# Patient Record
Sex: FEMALE | Race: WHITE | Employment: OTHER | URBAN - METROPOLITAN AREA
[De-identification: names, ages, dates, MRNs, and addresses within clinical notes are randomized per-mention and may not be internally consistent; named-entity substitution may affect disease eponyms.]

---

## 2017-01-17 ENCOUNTER — TELEPHONE (OUTPATIENT)
Dept: INTERNAL MEDICINE CLINIC | Facility: CLINIC | Age: 57
End: 2017-01-17

## 2017-01-17 RX ORDER — AMOXICILLIN 500 MG/1
500 CAPSULE ORAL 2 TIMES DAILY
Qty: 14 CAPSULE | Refills: 0 | Status: SHIPPED | OUTPATIENT
Start: 2017-01-17 | End: 2017-01-24

## 2017-01-17 NOTE — TELEPHONE ENCOUNTER
Amoxicillin 500 mg bid  #14 sent to [facility]/ DR MASON   states pt will need to be off work for at least 3 days so will make appt to be seen because she needs note for work  Will start meds today and will be seen later this week

## 2017-01-17 NOTE — TELEPHONE ENCOUNTER
Pt spouse calling, pt lost her voice, coughing, runny nose, started 1/13, pt would like something called into [facility], please call spouse Pepe Payan [phone]    Tasked to nursing high

## 2017-01-17 NOTE — TELEPHONE ENCOUNTER
We can call in amoxicillin 500 mg. Quantity #14.  1 p.o. twice a day. Twice daily. No refills.   I can see her if symptoms persist.

## 2017-01-18 ENCOUNTER — HOSPITAL ENCOUNTER (OUTPATIENT)
Dept: GENERAL RADIOLOGY | Facility: HOSPITAL | Age: 57
Discharge: HOME | End: 2017-01-18
Attending: NEUROLOGICAL SURGERY
Payer: COMMERCIAL

## 2017-01-18 ENCOUNTER — PRIOR ORIGINAL RECORDS (OUTPATIENT)
Dept: OTHER | Age: 57
End: 2017-01-18

## 2017-01-18 ENCOUNTER — LAB ENCOUNTER (OUTPATIENT)
Dept: LAB | Facility: HOSPITAL | Age: 57
End: 2017-01-18
Attending: NEUROLOGICAL SURGERY
Payer: COMMERCIAL

## 2017-01-18 DIAGNOSIS — M54.50 LOW BACK PAIN: Primary | ICD-10-CM

## 2017-01-18 DIAGNOSIS — M54.5 LOW BACK PAIN, UNSPECIFIED BACK PAIN LATERALITY, UNSPECIFIED CHRONICITY, WITH SCIATICA PRESENCE UNSPECIFIED: ICD-10-CM

## 2017-01-18 DIAGNOSIS — M54.50 CHRONIC LOW BACK PAIN: ICD-10-CM

## 2017-01-18 DIAGNOSIS — R79.89 ELEVATED TSH: ICD-10-CM

## 2017-01-18 DIAGNOSIS — Z13.828 SCOLIOSIS CONCERN: ICD-10-CM

## 2017-01-18 DIAGNOSIS — R73.9 ELEVATED BLOOD SUGAR: ICD-10-CM

## 2017-01-18 DIAGNOSIS — G89.29 CHRONIC LOW BACK PAIN: ICD-10-CM

## 2017-01-18 LAB
ANION GAP SERPL CALC-SCNC: 8 MMOL/L (ref 0–18)
BUN SERPL-MCNC: 9 MG/DL (ref 8–20)
BUN/CREAT SERPL: 11.7 (ref 10–20)
CALCIUM SERPL-MCNC: 9.5 MG/DL (ref 8.5–10.5)
CHLORIDE SERPL-SCNC: 104 MMOL/L (ref 95–110)
CHOLEST SERPL-MCNC: 155 MG/DL (ref 110–200)
CO2 SERPL-SCNC: 30 MMOL/L (ref 22–32)
CREAT SERPL-MCNC: 0.77 MG/DL (ref 0.5–1.5)
GLUCOSE SERPL-MCNC: 97 MG/DL (ref 70–99)
HBA1C MFR BLD: 5.2 % (ref 4–6)
HDLC SERPL-MCNC: 49 MG/DL
LDLC SERPL CALC-MCNC: 93 MG/DL (ref 0–99)
NONHDLC SERPL-MCNC: 106 MG/DL
OSMOLALITY UR CALC.SUM OF ELEC: 293 MOSM/KG (ref 275–295)
POTASSIUM SERPL-SCNC: 4.3 MMOL/L (ref 3.3–5.1)
RHEUMATOID FACT SER QL: <5 IU/ML
SODIUM SERPL-SCNC: 142 MMOL/L (ref 136–144)
TRIGL SERPL-MCNC: 65 MG/DL (ref 1–149)

## 2017-01-18 PROCEDURE — 80061 LIPID PANEL: CPT

## 2017-01-18 PROCEDURE — 83036 HEMOGLOBIN GLYCOSYLATED A1C: CPT

## 2017-01-18 PROCEDURE — 72110 X-RAY EXAM L-2 SPINE 4/>VWS: CPT

## 2017-01-18 PROCEDURE — 84443 ASSAY THYROID STIM HORMONE: CPT

## 2017-01-18 PROCEDURE — 36415 COLL VENOUS BLD VENIPUNCTURE: CPT

## 2017-01-18 PROCEDURE — 86431 RHEUMATOID FACTOR QUANT: CPT

## 2017-01-18 PROCEDURE — 80048 BASIC METABOLIC PNL TOTAL CA: CPT

## 2017-01-18 PROCEDURE — 72082 X-RAY EXAM ENTIRE SPI 2/3 VW: CPT

## 2017-01-18 PROCEDURE — 84439 ASSAY OF FREE THYROXINE: CPT

## 2017-01-19 ENCOUNTER — TELEPHONE (OUTPATIENT)
Dept: INTERNAL MEDICINE CLINIC | Facility: CLINIC | Age: 57
End: 2017-01-19

## 2017-01-19 DIAGNOSIS — R79.89 ELEVATED TSH: Primary | ICD-10-CM

## 2017-01-19 LAB
T4 FREE SERPL-MCNC: 0.93 NG/DL (ref 0.58–1.64)
TSH SERPL-ACNC: 4.27 UIU/ML (ref 0.34–5.6)

## 2017-02-02 LAB
BUN: 9 MG/DL
CALCIUM: 9.5 MG/DL
CHLORIDE: 104 MEQ/L
CHOLESTEROL, TOTAL: 155 MG/DL
CREATININE, SERUM: 0.77 MG/DL
FREE T4: 0.93 MG/DL
GLUCOSE: 97 MG/DL
HDL CHOLESTEROL: 49 MG/DL
LDL CHOLESTEROL: 93 MG/DL
NON-HDL CHOLESTEROL: 106 MG/DL
POTASSIUM, SERUM: 4.3 MEQ/L
SODIUM: 142 MEQ/L
THYROID STIMULATING HORMONE: 4.27 MLU/L
TRIGLYCERIDES: 65 MG/DL

## 2017-02-07 ENCOUNTER — PRIOR ORIGINAL RECORDS (OUTPATIENT)
Dept: OTHER | Age: 57
End: 2017-02-07

## 2017-02-07 ENCOUNTER — TELEPHONE (OUTPATIENT)
Dept: INTERNAL MEDICINE CLINIC | Facility: CLINIC | Age: 57
End: 2017-02-07

## 2017-02-07 DIAGNOSIS — I35.1 NONRHEUMATIC AORTIC (VALVE) INSUFFICIENCY: Primary | ICD-10-CM

## 2017-02-07 NOTE — TELEPHONE ENCOUNTER
Called patient and relayed message that referral had been entered. Patient verbalized understanding.

## 2017-02-08 ENCOUNTER — HOSPITAL ENCOUNTER (OUTPATIENT)
Dept: NUCLEAR MEDICINE | Facility: HOSPITAL | Age: 57
Discharge: HOME | End: 2017-02-08
Attending: NEUROLOGICAL SURGERY
Payer: COMMERCIAL

## 2017-02-08 ENCOUNTER — HOSPITAL ENCOUNTER (OUTPATIENT)
Dept: CT IMAGING | Facility: HOSPITAL | Age: 57
Discharge: HOME | End: 2017-02-08
Attending: NEUROLOGICAL SURGERY
Payer: COMMERCIAL

## 2017-02-08 DIAGNOSIS — M54.5 LOW BACK PAIN, UNSPECIFIED BACK PAIN LATERALITY, UNSPECIFIED CHRONICITY, WITH SCIATICA PRESENCE UNSPECIFIED: ICD-10-CM

## 2017-02-08 DIAGNOSIS — G89.29 OTHER CHRONIC PAIN: ICD-10-CM

## 2017-02-08 PROCEDURE — 78306 BONE IMAGING WHOLE BODY: CPT

## 2017-02-08 PROCEDURE — 72131 CT LUMBAR SPINE W/O DYE: CPT

## 2017-02-08 PROCEDURE — 72192 CT PELVIS W/O DYE: CPT

## 2017-04-06 ENCOUNTER — TELEPHONE (OUTPATIENT)
Dept: INTERNAL MEDICINE CLINIC | Facility: CLINIC | Age: 57
End: 2017-04-06

## 2017-05-15 ENCOUNTER — TELEPHONE (OUTPATIENT)
Dept: INTERNAL MEDICINE CLINIC | Facility: CLINIC | Age: 57
End: 2017-05-15

## 2017-05-15 DIAGNOSIS — Z01.419 ENCOUNTER FOR ANNUAL ROUTINE GYNECOLOGICAL EXAMINATION: Primary | ICD-10-CM

## 2017-05-26 ENCOUNTER — HOSPITAL ENCOUNTER (OUTPATIENT)
Dept: CV DIAGNOSTICS | Facility: HOSPITAL | Age: 57
Discharge: HOME | End: 2017-05-26
Attending: INTERNAL MEDICINE
Payer: COMMERCIAL

## 2017-05-26 DIAGNOSIS — I35.1 AORTIC INSUFFICIENCY: ICD-10-CM

## 2017-05-26 PROCEDURE — 93306 TTE W/DOPPLER COMPLETE: CPT | Performed by: INTERNAL MEDICINE

## 2017-06-01 ENCOUNTER — PRIOR ORIGINAL RECORDS (OUTPATIENT)
Dept: OTHER | Age: 57
End: 2017-06-01

## 2017-06-21 ENCOUNTER — LAB REQUISITION (OUTPATIENT)
Dept: LAB | Facility: HOSPITAL | Age: 57
End: 2017-06-21
Payer: COMMERCIAL

## 2017-06-21 DIAGNOSIS — Z11.51 ENCOUNTER FOR SCREENING FOR HUMAN PAPILLOMAVIRUS (HPV): ICD-10-CM

## 2017-06-21 DIAGNOSIS — Z01.419 ENCOUNTER FOR GYNECOLOGICAL EXAMINATION WITHOUT ABNORMAL FINDING: ICD-10-CM

## 2017-06-21 PROCEDURE — 87624 HPV HI-RISK TYP POOLED RSLT: CPT | Performed by: OBSTETRICS & GYNECOLOGY

## 2017-06-21 PROCEDURE — 88175 CYTOPATH C/V AUTO FLUID REDO: CPT | Performed by: OBSTETRICS & GYNECOLOGY

## 2017-06-22 PROCEDURE — 88108 CYTOPATH CONCENTRATE TECH: CPT | Performed by: UROLOGY

## 2017-07-06 ENCOUNTER — PRIOR ORIGINAL RECORDS (OUTPATIENT)
Dept: OTHER | Age: 57
End: 2017-07-06

## 2017-07-06 ENCOUNTER — MYAURORA ACCOUNT LINK (OUTPATIENT)
Dept: OTHER | Age: 57
End: 2017-07-06

## 2017-07-14 ENCOUNTER — HOSPITAL ENCOUNTER (OUTPATIENT)
Dept: MAMMOGRAPHY | Facility: HOSPITAL | Age: 57
Discharge: HOME | End: 2017-07-14
Attending: OBSTETRICS & GYNECOLOGY
Payer: COMMERCIAL

## 2017-07-14 ENCOUNTER — HOSPITAL ENCOUNTER (OUTPATIENT)
Dept: ULTRASOUND IMAGING | Facility: HOSPITAL | Age: 57
Discharge: HOME | End: 2017-07-14
Attending: OBSTETRICS & GYNECOLOGY
Payer: COMMERCIAL

## 2017-07-14 DIAGNOSIS — Z12.31 ENCOUNTER FOR SCREENING MAMMOGRAM FOR MALIGNANT NEOPLASM OF BREAST: ICD-10-CM

## 2017-07-14 DIAGNOSIS — N83.209 UNSPECIFIED OVARIAN CYST, UNSPECIFIED SIDE: ICD-10-CM

## 2017-07-14 PROCEDURE — 77067 SCR MAMMO BI INCL CAD: CPT | Performed by: OBSTETRICS & GYNECOLOGY

## 2017-07-14 PROCEDURE — 76856 US EXAM PELVIC COMPLETE: CPT | Performed by: OBSTETRICS & GYNECOLOGY

## 2017-07-14 PROCEDURE — 76830 TRANSVAGINAL US NON-OB: CPT | Performed by: OBSTETRICS & GYNECOLOGY

## 2017-07-26 ENCOUNTER — TELEPHONE (OUTPATIENT)
Dept: INTERNAL MEDICINE CLINIC | Facility: CLINIC | Age: 57
End: 2017-07-26

## 2017-07-26 DIAGNOSIS — G89.29 CHRONIC LOW BACK PAIN, UNSPECIFIED BACK PAIN LATERALITY, WITH SCIATICA PRESENCE UNSPECIFIED: Primary | ICD-10-CM

## 2017-07-26 DIAGNOSIS — M54.5 CHRONIC LOW BACK PAIN, UNSPECIFIED BACK PAIN LATERALITY, WITH SCIATICA PRESENCE UNSPECIFIED: Primary | ICD-10-CM

## 2017-07-26 NOTE — TELEPHONE ENCOUNTER
Pt will be seeing Dr Cj Lr, pain specialist, on Friday 7/28 and needs insurance referral sent to fax# [phone]    Doctor is located in the medical office building by [address] [place] [address] [address]      Please call pt to confirm that referral has been sent [phone]

## 2017-07-27 NOTE — TELEPHONE ENCOUNTER
KEV Ratliff. Dr. Eleazar Botello is a neurological surgeon. Is this the appropriate referral for patient.   It appears she wanted more of a pain specialist.

## 2017-07-27 NOTE — TELEPHONE ENCOUNTER
Please let patient know that I wanted to place the referral to the doctors she requested however they are not in her insurance plan. Carl Unger is out of [facility].  I placed referral if that is acceptable to her.   I wanted her to know this bef

## 2017-07-27 NOTE — TELEPHONE ENCOUNTER
I called Dr. Kayla Valdez' office. Patient was seen in January 18,2017. He recommended their pain clinic and told her she needed to go where she is allowed with her HMO.   I notified patient I would ask for your advise since she really should see our pain clini

## 2017-07-27 NOTE — TELEPHONE ENCOUNTER
Patient should not be going to [facility] pain clinic. Patient notified. I will call Dr. Kayla Valdez for consultation,  Patient needs to see our [address] [place] [address] pain clinic. Please place a new referral. Thank Cedric Huddleston

## 2017-07-27 NOTE — TELEPHONE ENCOUNTER
I think we should stay within network. Please let me know which pain specialist to refer that is within our network. I can then make referral.  Thank you for your help.

## 2017-07-27 NOTE — TELEPHONE ENCOUNTER
Called patient and relayed message from Dr. Ashly Saldaña. She verbalized understanding and requests authorized referral be mailed to her home. To [facility]. Please mail referral to patient's home.  She states she received a message that it was available to her on por

## 2017-08-02 ENCOUNTER — TELEPHONE (OUTPATIENT)
Dept: INTERNAL MEDICINE CLINIC | Facility: CLINIC | Age: 57
End: 2017-08-02

## 2017-08-02 ENCOUNTER — PRIOR ORIGINAL RECORDS (OUTPATIENT)
Dept: OTHER | Age: 57
End: 2017-08-02

## 2017-08-02 DIAGNOSIS — I35.1 NONRHEUMATIC AORTIC VALVE INSUFFICIENCY: Primary | ICD-10-CM

## 2017-08-02 NOTE — TELEPHONE ENCOUNTER
Patient has an appt today with Dr. Erin Alex at [phone]     Pt has bc hmo  And is asking for a referral     plz call pt and let her know this has been done and faxed     [phone]

## 2017-08-02 NOTE — TELEPHONE ENCOUNTER
To nursing--diagnosis according to Dr. Juan Lion notes for seeing cardiologist is nonrheumatic aortic valve insufficiency. Thanks.

## 2017-08-02 NOTE — TELEPHONE ENCOUNTER
Could you please advise for DR. MASON patient ? Referral started , but needs diagnosis thank you - to DR. Severo Pastures

## 2017-08-15 RX ORDER — LEVOTHYROXINE SODIUM 0.07 MG/1
TABLET ORAL
Qty: 90 TABLET | Refills: 3 | Status: SHIPPED | OUTPATIENT
Start: 2017-08-15 | End: 2018-08-18

## 2017-09-14 ENCOUNTER — LAB ENCOUNTER (OUTPATIENT)
Dept: LAB | Facility: HOSPITAL | Age: 57
End: 2017-09-14
Attending: INTERNAL MEDICINE
Payer: COMMERCIAL

## 2017-09-14 DIAGNOSIS — I35.1 AORTIC INCOMPETENCE: Primary | ICD-10-CM

## 2017-09-14 LAB
ANION GAP SERPL CALC-SCNC: 6 MMOL/L (ref 0–18)
BUN SERPL-MCNC: 12 MG/DL (ref 8–20)
BUN/CREAT SERPL: 15.4 (ref 10–20)
CALCIUM SERPL-MCNC: 9.8 MG/DL (ref 8.5–10.5)
CHLORIDE SERPL-SCNC: 105 MMOL/L (ref 95–110)
CO2 SERPL-SCNC: 30 MMOL/L (ref 22–32)
CREAT SERPL-MCNC: 0.78 MG/DL (ref 0.5–1.5)
GLUCOSE SERPL-MCNC: 91 MG/DL (ref 70–99)
OSMOLALITY UR CALC.SUM OF ELEC: 291 MOSM/KG (ref 275–295)
POTASSIUM SERPL-SCNC: 4.4 MMOL/L (ref 3.3–5.1)
SODIUM SERPL-SCNC: 141 MMOL/L (ref 136–144)

## 2017-09-14 PROCEDURE — 80048 BASIC METABOLIC PNL TOTAL CA: CPT

## 2017-09-14 PROCEDURE — 36415 COLL VENOUS BLD VENIPUNCTURE: CPT

## 2017-09-15 ENCOUNTER — TELEPHONE (OUTPATIENT)
Dept: INTERNAL MEDICINE CLINIC | Facility: CLINIC | Age: 57
End: 2017-09-15

## 2017-09-15 ENCOUNTER — PRIOR ORIGINAL RECORDS (OUTPATIENT)
Dept: OTHER | Age: 57
End: 2017-09-15

## 2017-09-15 LAB
BUN: 12 MG/DL
CALCIUM: 9.8 MG/DL
CHLORIDE: 105 MEQ/L
CREATININE, SERUM: 0.78 MG/DL
GLUCOSE: 91 MG/DL
POTASSIUM, SERUM: 4.4 MEQ/L
SODIUM: 141 MEQ/L

## 2017-09-15 NOTE — TELEPHONE ENCOUNTER
Please notify patient that her BMP was normal.  No other testing was done so I am assuming that we may just wanted to have checked her blood sugar and electrolytes and potassium. She appears fairly up-to-date with the rest of her routine labs.

## 2017-09-18 NOTE — TELEPHONE ENCOUNTER
Patient called back - relayed DR. MASON message and she verbalized understanding. Wants copy of lab results mailed to her home, also lab results of her daughter Meeta who is handicapped.  Copies of lab results mailed to patients home

## 2017-09-21 ENCOUNTER — TELEPHONE (OUTPATIENT)
Dept: INTERNAL MEDICINE CLINIC | Facility: CLINIC | Age: 57
End: 2017-09-21

## 2017-09-21 RX ORDER — METRONIDAZOLE 10 MG/G
1 GEL TOPICAL DAILY PRN
Qty: 60 G | Refills: 3 | Status: SHIPPED | OUTPATIENT
Start: 2017-09-21 | End: 2018-01-19

## 2017-09-21 NOTE — TELEPHONE ENCOUNTER
To [employer] - please call pt to schedule annual physical exam - last visit 11/11/16. To Dr. Ijeoma Choudhury - see below - last order was 5/2016.

## 2017-09-21 NOTE — TELEPHONE ENCOUNTER
abdon in  [facility] requesting a new rx for     Metronidazole 1% gel - last rx too old     plz fax new rx

## 2017-09-21 NOTE — TELEPHONE ENCOUNTER
Okay to refill metronidazole gel 1%. Apply daily to rash. I am not sure what quantity it comes in. Please call pharmacy to see if they can use last quantity. Okay to refill as needed.

## 2017-09-25 ENCOUNTER — PRIOR ORIGINAL RECORDS (OUTPATIENT)
Dept: OTHER | Age: 57
End: 2017-09-25

## 2017-09-27 ENCOUNTER — TELEPHONE (OUTPATIENT)
Dept: INTERNAL MEDICINE CLINIC | Facility: CLINIC | Age: 57
End: 2017-09-27

## 2017-09-27 DIAGNOSIS — H00.019 HORDEOLUM EXTERNUM, UNSPECIFIED LATERALITY: Primary | ICD-10-CM

## 2017-09-27 NOTE — TELEPHONE ENCOUNTER
Pt. Called asking if Dr. Dontae Morales will give her a referral to see Dr. Yoon Davenport the eye Dr.? She has a possible sty that is causing her pain, swelling, and redness. She has not seen Dr. Yoon Davenport before, but her  has. She has [employer].

## 2018-02-13 ENCOUNTER — TELEPHONE (OUTPATIENT)
Dept: INTERNAL MEDICINE CLINIC | Facility: CLINIC | Age: 58
End: 2018-02-13

## 2018-02-13 ENCOUNTER — PRIOR ORIGINAL RECORDS (OUTPATIENT)
Dept: OTHER | Age: 58
End: 2018-02-13

## 2018-02-13 ENCOUNTER — MYAURORA ACCOUNT LINK (OUTPATIENT)
Dept: OTHER | Age: 58
End: 2018-02-13

## 2018-02-13 DIAGNOSIS — I35.1 NONRHEUMATIC AORTIC (VALVE) INSUFFICIENCY: Primary | ICD-10-CM

## 2018-02-13 NOTE — TELEPHONE ENCOUNTER
Pt is at Dr Saravanan Estrada' office now for an appt  Needs insurance referral today as she has an hmo     Pt can be reached on her cell ph# [phone]

## 2018-02-13 NOTE — TELEPHONE ENCOUNTER
Referral generated as patient has seen Jade Lazar in the past. To [facility] to process as patient is there to be seen now

## 2018-03-16 ENCOUNTER — LAB ENCOUNTER (OUTPATIENT)
Dept: LAB | Facility: HOSPITAL | Age: 58
End: 2018-03-16
Attending: INTERNAL MEDICINE
Payer: COMMERCIAL

## 2018-03-16 ENCOUNTER — PRIOR ORIGINAL RECORDS (OUTPATIENT)
Dept: OTHER | Age: 58
End: 2018-03-16

## 2018-03-16 DIAGNOSIS — I35.1 AORTIC INCOMPETENCE: Primary | ICD-10-CM

## 2018-03-16 LAB
ALT SERPL-CCNC: 15 U/L (ref 14–54)
ANION GAP SERPL CALC-SCNC: 7 MMOL/L (ref 0–18)
AST SERPL-CCNC: 20 U/L (ref 15–41)
BUN SERPL-MCNC: 10 MG/DL (ref 8–20)
BUN/CREAT SERPL: 12.2 (ref 10–20)
CALCIUM SERPL-MCNC: 9.4 MG/DL (ref 8.5–10.5)
CHLORIDE SERPL-SCNC: 103 MMOL/L (ref 95–110)
CHOLEST SERPL-MCNC: 172 MG/DL (ref 110–200)
CO2 SERPL-SCNC: 29 MMOL/L (ref 22–32)
CREAT SERPL-MCNC: 0.82 MG/DL (ref 0.5–1.5)
GLUCOSE SERPL-MCNC: 93 MG/DL (ref 70–99)
HDLC SERPL-MCNC: 55 MG/DL
LDLC SERPL CALC-MCNC: 106 MG/DL (ref 0–99)
NONHDLC SERPL-MCNC: 117 MG/DL
OSMOLALITY UR CALC.SUM OF ELEC: 287 MOSM/KG (ref 275–295)
POTASSIUM SERPL-SCNC: 4.1 MMOL/L (ref 3.3–5.1)
SODIUM SERPL-SCNC: 139 MMOL/L (ref 136–144)
TRIGL SERPL-MCNC: 56 MG/DL (ref 1–149)

## 2018-03-16 PROCEDURE — 80048 BASIC METABOLIC PNL TOTAL CA: CPT

## 2018-03-16 PROCEDURE — 84450 TRANSFERASE (AST) (SGOT): CPT

## 2018-03-16 PROCEDURE — 36415 COLL VENOUS BLD VENIPUNCTURE: CPT

## 2018-03-16 PROCEDURE — 80061 LIPID PANEL: CPT

## 2018-03-16 PROCEDURE — 84460 ALANINE AMINO (ALT) (SGPT): CPT

## 2018-03-19 ENCOUNTER — PRIOR ORIGINAL RECORDS (OUTPATIENT)
Dept: OTHER | Age: 58
End: 2018-03-19

## 2018-03-19 LAB
ALT (SGPT): 15 U/L
AST (SGOT): 20 U/L
CALCIUM: 9.4 MG/DL
CHLORIDE: 103 MEQ/L
CHOLESTEROL, TOTAL: 172 MG/DL
CREATININE, SERUM: 0.82 MG/DL
GLUCOSE: 93 MG/DL
GLUCOSE: 93 MG/DL
HDL CHOLESTEROL: 55 MG/DL
LDL CHOLESTEROL: 106 MG/DL
POTASSIUM, SERUM: 4.1 MEQ/L
SGOT (AST): 20 IU/L
SGPT (ALT): 15 IU/L
SODIUM: 139 MEQ/L
TRIGLYCERIDES: 56 MG/DL

## 2018-05-03 ENCOUNTER — HOSPITAL ENCOUNTER (OUTPATIENT)
Dept: CV DIAGNOSTICS | Facility: HOSPITAL | Age: 58
Discharge: HOME | End: 2018-05-03
Attending: INTERNAL MEDICINE
Payer: COMMERCIAL

## 2018-05-03 DIAGNOSIS — I35.1 AORTIC VALVE INSUFFICIENCY, ETIOLOGY OF CARDIAC VALVE DISEASE UNSPECIFIED: ICD-10-CM

## 2018-05-03 PROCEDURE — 93306 TTE W/DOPPLER COMPLETE: CPT | Performed by: INTERNAL MEDICINE

## 2018-05-07 ENCOUNTER — PRIOR ORIGINAL RECORDS (OUTPATIENT)
Dept: OTHER | Age: 58
End: 2018-05-07

## 2018-05-14 ENCOUNTER — OFFICE VISIT (OUTPATIENT)
Dept: INTERNAL MEDICINE CLINIC | Facility: CLINIC | Age: 58
End: 2018-05-14

## 2018-05-14 VITALS
WEIGHT: 148 LBS | TEMPERATURE: 98 F | BODY MASS INDEX: 23.23 KG/M2 | DIASTOLIC BLOOD PRESSURE: 78 MMHG | SYSTOLIC BLOOD PRESSURE: 112 MMHG | OXYGEN SATURATION: 98 % | HEART RATE: 72 BPM | HEIGHT: 66.75 IN

## 2018-05-14 DIAGNOSIS — I35.1 NONRHEUMATIC AORTIC VALVE INSUFFICIENCY: ICD-10-CM

## 2018-05-14 DIAGNOSIS — Z01.419 ENCOUNTER FOR ANNUAL ROUTINE GYNECOLOGICAL EXAMINATION: ICD-10-CM

## 2018-05-14 DIAGNOSIS — Z20.1 EXPOSURE TO TB: ICD-10-CM

## 2018-05-14 DIAGNOSIS — M54.50 CHRONIC MIDLINE LOW BACK PAIN WITHOUT SCIATICA: ICD-10-CM

## 2018-05-14 DIAGNOSIS — R73.9 ELEVATED BLOOD SUGAR: ICD-10-CM

## 2018-05-14 DIAGNOSIS — Z00.00 ANNUAL PHYSICAL EXAM: Primary | ICD-10-CM

## 2018-05-14 DIAGNOSIS — G89.29 CHRONIC MIDLINE LOW BACK PAIN WITHOUT SCIATICA: ICD-10-CM

## 2018-05-14 DIAGNOSIS — Z00.00 ROUTINE HEALTH MAINTENANCE: ICD-10-CM

## 2018-05-14 PROCEDURE — 99396 PREV VISIT EST AGE 40-64: CPT | Performed by: INTERNAL MEDICINE

## 2018-05-14 PROCEDURE — 99214 OFFICE O/P EST MOD 30 MIN: CPT | Performed by: INTERNAL MEDICINE

## 2018-05-14 PROCEDURE — 99212 OFFICE O/P EST SF 10 MIN: CPT | Performed by: INTERNAL MEDICINE

## 2018-05-14 NOTE — PROGRESS NOTES
HPI:   Emerald Wilkins is a 59year old female presents with the following problems. Patient feels fairly well. She has no acute complaints today. She was evaluated by Dr. Nilam Gerber for low back pain. She never did get pain center evaluation. right hip    • Hypothyroidism    • Lumbar spondylosis       Past Surgical History:  6-5-2014: OTHER SURGICAL HISTORY      Comment: shoulder surgery  No date: TONSILLECTOMY   Family History   Problem Relation Age of Onset   • Other [OTHER] Father    • Heart 6.75\" (1.695 m)   Wt 148 lb (67.1 kg)   SpO2 98%   BMI 23.35 kg/m²     GENERAL:  well developed, well nourished. in no apparent distress  SKIN:  no rashes. no suspicious lesions in areas examined. HEENT:  Atraumatic. TM's normal,  canals clear.   [employer]. She is in the same room with them in fact sits next to them with her desk and where they reside. She states that many of them have coughs. Will go ahead and get TB QuantiFERON gold test.  - QUANTIFERON TB; Future    7.  Elevated blood

## 2018-05-31 ENCOUNTER — TELEPHONE (OUTPATIENT)
Dept: INTERNAL MEDICINE CLINIC | Facility: CLINIC | Age: 58
End: 2018-05-31

## 2018-05-31 NOTE — TELEPHONE ENCOUNTER
Called Dr. Migel Gautam office and spoke with Trey Kayser. Patient was seen there on January 18, 2017. She also had xrays and MRI. Trey Kayser will fax info now. HEMAL TolentinoR, notified to watch for fax and give to Dr. Ashly Saldaña.  If it does not arrive soon, we can call Erika Odell

## 2018-05-31 NOTE — TELEPHONE ENCOUNTER
Please call Dr. Mariaa Brown at [phone]. He had seen patient I believe sometime last year. Please ask him for his office visit notes and treatment plans that they may have been the records.  Thank you

## 2018-06-01 ENCOUNTER — TELEPHONE (OUTPATIENT)
Dept: INTERNAL MEDICINE CLINIC | Facility: CLINIC | Age: 58
End: 2018-06-01

## 2018-06-01 DIAGNOSIS — M25.552 PAIN OF BOTH HIP JOINTS: ICD-10-CM

## 2018-06-01 DIAGNOSIS — M25.551 PAIN OF BOTH HIP JOINTS: ICD-10-CM

## 2018-06-01 DIAGNOSIS — M54.50 CHRONIC LOW BACK PAIN WITHOUT SCIATICA, UNSPECIFIED BACK PAIN LATERALITY: Primary | ICD-10-CM

## 2018-06-01 DIAGNOSIS — G89.29 CHRONIC LOW BACK PAIN WITHOUT SCIATICA, UNSPECIFIED BACK PAIN LATERALITY: Primary | ICD-10-CM

## 2018-06-01 NOTE — TELEPHONE ENCOUNTER
Please call patient and let her know that I did get notes from Dr. Leena Lantigua the doctor that she saw back January 2017. He noted that he thought that she had bilateral hip bursitis.   He also noted he was not able to identify a structural pain generator in th

## 2018-06-05 NOTE — TELEPHONE ENCOUNTER
Please advise - raphael dpatient an relayed DR. MASON message . She is interested on seeing DR. Kirsty Solis - she saw someone in that office before - referral pending. Copy of reports from DR. Hernandez mailed to patients home - to DR. MASON

## 2018-07-02 ENCOUNTER — TELEPHONE (OUTPATIENT)
Dept: INTERNAL MEDICINE CLINIC | Facility: CLINIC | Age: 58
End: 2018-07-02

## 2018-07-02 ENCOUNTER — LAB ENCOUNTER (OUTPATIENT)
Dept: LAB | Facility: HOSPITAL | Age: 58
End: 2018-07-02
Attending: INTERNAL MEDICINE
Payer: COMMERCIAL

## 2018-07-02 DIAGNOSIS — Z00.00 ANNUAL PHYSICAL EXAM: ICD-10-CM

## 2018-07-02 DIAGNOSIS — G89.29 CHRONIC MIDLINE LOW BACK PAIN WITHOUT SCIATICA: ICD-10-CM

## 2018-07-02 DIAGNOSIS — M54.50 CHRONIC LOW BACK PAIN WITHOUT SCIATICA, UNSPECIFIED BACK PAIN LATERALITY: ICD-10-CM

## 2018-07-02 DIAGNOSIS — M54.50 CHRONIC MIDLINE LOW BACK PAIN WITHOUT SCIATICA: ICD-10-CM

## 2018-07-02 DIAGNOSIS — R31.29 HEMATURIA, MICROSCOPIC: Primary | ICD-10-CM

## 2018-07-02 DIAGNOSIS — R73.9 ELEVATED BLOOD SUGAR: ICD-10-CM

## 2018-07-02 DIAGNOSIS — G89.29 CHRONIC LOW BACK PAIN WITHOUT SCIATICA, UNSPECIFIED BACK PAIN LATERALITY: ICD-10-CM

## 2018-07-02 DIAGNOSIS — Z20.1 EXPOSURE TO TB: ICD-10-CM

## 2018-07-02 LAB
ALBUMIN SERPL BCP-MCNC: 4.1 G/DL (ref 3.5–4.8)
ALBUMIN/GLOB SERPL: 1.5 {RATIO} (ref 1–2)
ALP SERPL-CCNC: 70 U/L (ref 32–100)
ALT SERPL-CCNC: 18 U/L (ref 14–54)
ANION GAP SERPL CALC-SCNC: 8 MMOL/L (ref 0–18)
AST SERPL-CCNC: 21 U/L (ref 15–41)
BACTERIA UR QL AUTO: NEGATIVE /HPF
BASOPHILS # BLD: 0 K/UL (ref 0–0.2)
BASOPHILS NFR BLD: 0 %
BILIRUB SERPL-MCNC: 0.9 MG/DL (ref 0.3–1.2)
BILIRUB UR QL: NEGATIVE
BUN SERPL-MCNC: 13 MG/DL (ref 8–20)
BUN/CREAT SERPL: 15.9 (ref 10–20)
CALCIUM SERPL-MCNC: 9.7 MG/DL (ref 8.5–10.5)
CHLORIDE SERPL-SCNC: 103 MMOL/L (ref 95–110)
CHOLEST SERPL-MCNC: 177 MG/DL (ref 110–200)
CLARITY UR: CLEAR
CO2 SERPL-SCNC: 30 MMOL/L (ref 22–32)
COLOR UR: YELLOW
CREAT SERPL-MCNC: 0.82 MG/DL (ref 0.5–1.5)
EOSINOPHIL # BLD: 0 K/UL (ref 0–0.7)
EOSINOPHIL NFR BLD: 1 %
ERYTHROCYTE [DISTWIDTH] IN BLOOD BY AUTOMATED COUNT: 12.9 % (ref 11–15)
GLOBULIN PLAS-MCNC: 2.8 G/DL (ref 2.5–3.7)
GLUCOSE SERPL-MCNC: 94 MG/DL (ref 70–99)
GLUCOSE UR-MCNC: NEGATIVE MG/DL
HBA1C MFR BLD: 5.3 % (ref 4–6)
HCT VFR BLD AUTO: 40.1 % (ref 35–48)
HDLC SERPL-MCNC: 54 MG/DL
HGB BLD-MCNC: 13.6 G/DL (ref 12–16)
KETONES UR-MCNC: NEGATIVE MG/DL
LDLC SERPL CALC-MCNC: 115 MG/DL (ref 0–99)
LEUKOCYTE ESTERASE UR QL STRIP.AUTO: NEGATIVE
LYMPHOCYTES # BLD: 1.2 K/UL (ref 1–4)
LYMPHOCYTES NFR BLD: 26 %
MCH RBC QN AUTO: 31.1 PG (ref 27–32)
MCHC RBC AUTO-ENTMCNC: 33.9 G/DL (ref 32–37)
MCV RBC AUTO: 91.7 FL (ref 80–100)
MONOCYTES # BLD: 0.3 K/UL (ref 0–1)
MONOCYTES NFR BLD: 7 %
NEUTROPHILS # BLD AUTO: 2.9 K/UL (ref 1.8–7.7)
NEUTROPHILS NFR BLD: 66 %
NITRITE UR QL STRIP.AUTO: NEGATIVE
NONHDLC SERPL-MCNC: 123 MG/DL
OSMOLALITY UR CALC.SUM OF ELEC: 292 MOSM/KG (ref 275–295)
PATIENT FASTING: YES
PH UR: 6 [PH] (ref 5–8)
PLATELET # BLD AUTO: 180 K/UL (ref 140–400)
PMV BLD AUTO: 7.9 FL (ref 7.4–10.3)
POTASSIUM SERPL-SCNC: 4.5 MMOL/L (ref 3.3–5.1)
PROT SERPL-MCNC: 6.9 G/DL (ref 5.9–8.4)
PROT UR-MCNC: NEGATIVE MG/DL
RBC # BLD AUTO: 4.37 M/UL (ref 3.7–5.4)
RBC #/AREA URNS AUTO: 2 /HPF
SODIUM SERPL-SCNC: 141 MMOL/L (ref 136–144)
SP GR UR STRIP: 1.02 (ref 1–1.03)
TRIGL SERPL-MCNC: 41 MG/DL (ref 1–149)
TSH SERPL-ACNC: 2.14 UIU/ML (ref 0.45–5.33)
UROBILINOGEN UR STRIP-ACNC: <2
VIT C UR-MCNC: NEGATIVE MG/DL
WBC # BLD AUTO: 4.5 K/UL (ref 4–11)
WBC #/AREA URNS AUTO: <1 /HPF

## 2018-07-02 PROCEDURE — 80061 LIPID PANEL: CPT

## 2018-07-02 PROCEDURE — 84443 ASSAY THYROID STIM HORMONE: CPT

## 2018-07-02 PROCEDURE — 80053 COMPREHEN METABOLIC PANEL: CPT

## 2018-07-02 PROCEDURE — 86480 TB TEST CELL IMMUN MEASURE: CPT

## 2018-07-02 PROCEDURE — 36415 COLL VENOUS BLD VENIPUNCTURE: CPT

## 2018-07-02 PROCEDURE — 85025 COMPLETE CBC W/AUTO DIFF WBC: CPT

## 2018-07-02 PROCEDURE — 81001 URINALYSIS AUTO W/SCOPE: CPT

## 2018-07-02 PROCEDURE — 83036 HEMOGLOBIN GLYCOSYLATED A1C: CPT

## 2018-07-02 NOTE — TELEPHONE ENCOUNTER
Please call patient and let her know that her labs I think he was satisfactory. I placed a copy on cart for us to mail to her for her review. Noted is that her glycohemoglobin is very good at 5.3. Thyroid function good. Chemistries all look good to me.

## 2018-07-03 NOTE — TELEPHONE ENCOUNTER
Spoke with Mirta to inform her of your message below. Also mailed her and Meeta home their labs along with Meeta's XR. She is aware that both her and her daughters TB is pending.      She stated she will go to [address] [place] [address] for Urine Test. Also stated she would like a re

## 2018-07-04 LAB
M TB IFN-G CD4+ BCKGRND COR BLD-ACNC: 0.01 IU/ML
M TB IFN-G CD4+ T-CELLS BLD-ACNC: 0.03 IU/ML
M TB TUBERC IFN-G BLD QL: NEGATIVE
M TB TUBERC IGNF/MITOGEN IGNF CONTROL: 9.51 IU/ML

## 2018-07-05 NOTE — TELEPHONE ENCOUNTER
Please let patient know that her TB test and her daughters Meeta were both good. Negative. No infection with tuberculosis. ( I have similar message in Meeta's chart ). In addition message from [facility] with urinalysis and referral to Dr. Ying Bourne generated.

## 2018-07-05 NOTE — TELEPHONE ENCOUNTER
WENCESLAO per HIPAA with Dr. Nicholas Florentino messages from 7/3 and 7/5. Instructed patient to call with questions.

## 2018-08-20 RX ORDER — LEVOTHYROXINE SODIUM 0.07 MG/1
TABLET ORAL
Qty: 90 TABLET | Refills: 3 | Status: SHIPPED | OUTPATIENT
Start: 2018-08-20 | End: 2019-09-01

## 2018-08-21 RX ORDER — LEVOTHYROXINE SODIUM 0.07 MG/1
TABLET ORAL
Qty: 90 TABLET | Refills: 0 | OUTPATIENT
Start: 2018-08-21

## 2019-02-28 VITALS
WEIGHT: 150 LBS | RESPIRATION RATE: 18 BRPM | BODY MASS INDEX: 22.73 KG/M2 | SYSTOLIC BLOOD PRESSURE: 120 MMHG | DIASTOLIC BLOOD PRESSURE: 70 MMHG | HEIGHT: 68 IN | HEART RATE: 78 BPM

## 2019-02-28 VITALS
HEART RATE: 68 BPM | HEIGHT: 68 IN | SYSTOLIC BLOOD PRESSURE: 108 MMHG | DIASTOLIC BLOOD PRESSURE: 62 MMHG | RESPIRATION RATE: 18 BRPM | WEIGHT: 145 LBS | BODY MASS INDEX: 21.98 KG/M2

## 2019-03-01 VITALS
WEIGHT: 140 LBS | DIASTOLIC BLOOD PRESSURE: 60 MMHG | SYSTOLIC BLOOD PRESSURE: 122 MMHG | HEIGHT: 68 IN | HEART RATE: 81 BPM | RESPIRATION RATE: 18 BRPM | BODY MASS INDEX: 21.22 KG/M2

## 2019-03-22 RX ORDER — ENALAPRIL MALEATE 2.5 MG/1
TABLET ORAL
COMMUNITY
Start: 2018-10-22 | End: 2019-11-08 | Stop reason: SDUPTHER

## 2019-04-19 RX ORDER — MULTIVITAMIN
1 CAPSULE ORAL DAILY
COMMUNITY
Start: 2015-05-12

## 2019-04-19 RX ORDER — LEVOTHYROXINE SODIUM 88 UG/1
88 TABLET ORAL DAILY
COMMUNITY
Start: 2015-05-12

## 2019-05-02 ENCOUNTER — OFFICE VISIT (OUTPATIENT)
Dept: CARDIOLOGY | Age: 59
End: 2019-05-02

## 2019-05-02 VITALS
HEART RATE: 67 BPM | SYSTOLIC BLOOD PRESSURE: 128 MMHG | WEIGHT: 153 LBS | HEIGHT: 67 IN | DIASTOLIC BLOOD PRESSURE: 76 MMHG | BODY MASS INDEX: 24.01 KG/M2 | OXYGEN SATURATION: 98 %

## 2019-05-02 DIAGNOSIS — I35.1 AORTIC VALVE INSUFFICIENCY, ETIOLOGY OF CARDIAC VALVE DISEASE UNSPECIFIED: Primary | ICD-10-CM

## 2019-05-02 PROCEDURE — 99214 OFFICE O/P EST MOD 30 MIN: CPT | Performed by: INTERNAL MEDICINE

## 2019-05-02 ASSESSMENT — PATIENT HEALTH QUESTIONNAIRE - PHQ9
SUM OF ALL RESPONSES TO PHQ9 QUESTIONS 1 AND 2: 0
2. FEELING DOWN, DEPRESSED OR HOPELESS: NOT AT ALL
SUM OF ALL RESPONSES TO PHQ9 QUESTIONS 1 AND 2: 0
1. LITTLE INTEREST OR PLEASURE IN DOING THINGS: NOT AT ALL

## 2019-05-20 ENCOUNTER — TELEPHONE (OUTPATIENT)
Dept: CARDIOLOGY | Age: 59
End: 2019-05-20

## 2019-05-23 ENCOUNTER — OFFICE VISIT (OUTPATIENT)
Dept: INTERNAL MEDICINE CLINIC | Facility: CLINIC | Age: 59
End: 2019-05-23

## 2019-05-23 VITALS
HEIGHT: 66.5 IN | TEMPERATURE: 98 F | BODY MASS INDEX: 24.27 KG/M2 | WEIGHT: 152.81 LBS | OXYGEN SATURATION: 98 % | SYSTOLIC BLOOD PRESSURE: 138 MMHG | HEART RATE: 73 BPM | DIASTOLIC BLOOD PRESSURE: 78 MMHG

## 2019-05-23 DIAGNOSIS — M25.562 PAIN IN BOTH KNEES, UNSPECIFIED CHRONICITY: ICD-10-CM

## 2019-05-23 DIAGNOSIS — G89.29 CHRONIC LOW BACK PAIN WITHOUT SCIATICA, UNSPECIFIED BACK PAIN LATERALITY: ICD-10-CM

## 2019-05-23 DIAGNOSIS — M54.50 CHRONIC LOW BACK PAIN WITHOUT SCIATICA, UNSPECIFIED BACK PAIN LATERALITY: ICD-10-CM

## 2019-05-23 DIAGNOSIS — Z00.00 ROUTINE HEALTH MAINTENANCE: ICD-10-CM

## 2019-05-23 DIAGNOSIS — Z00.00 ANNUAL PHYSICAL EXAM: Primary | ICD-10-CM

## 2019-05-23 DIAGNOSIS — M25.561 PAIN IN BOTH KNEES, UNSPECIFIED CHRONICITY: ICD-10-CM

## 2019-05-23 DIAGNOSIS — I35.1 NONRHEUMATIC AORTIC VALVE INSUFFICIENCY: ICD-10-CM

## 2019-05-23 PROCEDURE — 99396 PREV VISIT EST AGE 40-64: CPT | Performed by: INTERNAL MEDICINE

## 2019-05-23 RX ORDER — METRONIDAZOLE 10 MG/G
1 GEL TOPICAL DAILY
COMMUNITY
End: 2020-05-12

## 2019-05-23 NOTE — PROGRESS NOTES
HPI:   Emerald Wilkins is a 59year old female presents with the following problems. Patient presents for annual physical.    She feels well. She has no acute complaints. She wishes referral to Dr. Loni Duarte from physiatry.   This is for her  Diagnosis Date   • ACL (anterior cruciate ligament) tear    • Aortic insufficiency    • Bursitis of right hip    • Hypothyroidism    • Lumbar spondylosis       Past Surgical History:   Procedure Laterality Date   • OTHER SURGICAL HISTORY  6-5-2014    carl Negative Negative    Blood Urine Small (A) Negative    Nitrite Urine Negative Negative    Urobilinogen Urine <2.0 <2.0    Leukocyte Esterase Urine Negative Negative    Ascorbic Acid Urine Negative Negative mg/dL    Squamous Epi.  Cells Few /HPF    WBC Urine Pulse 73   Temp 97.9 °F (36.6 °C) (Oral)   Ht 5' 6.5\" (1.689 m)   Wt 152 lb 12.8 oz (69.3 kg)   SpO2 98%   BMI 24.29 kg/m²     GENERAL:  well developed, well nourished. in no apparent distress  SKIN:  no rashes. no suspicious lesions in areas examined.

## 2019-06-29 ENCOUNTER — LAB ENCOUNTER (OUTPATIENT)
Dept: LAB | Facility: HOSPITAL | Age: 59
End: 2019-06-29
Attending: OBSTETRICS & GYNECOLOGY
Payer: COMMERCIAL

## 2019-06-29 ENCOUNTER — HOSPITAL ENCOUNTER (OUTPATIENT)
Dept: MAMMOGRAPHY | Facility: HOSPITAL | Age: 59
Discharge: HOME | End: 2019-06-29
Attending: OBSTETRICS & GYNECOLOGY
Payer: COMMERCIAL

## 2019-06-29 DIAGNOSIS — Z00.00 ANNUAL PHYSICAL EXAM: ICD-10-CM

## 2019-06-29 DIAGNOSIS — Z12.31 ENCOUNTER FOR SCREENING MAMMOGRAM FOR MALIGNANT NEOPLASM OF BREAST: ICD-10-CM

## 2019-06-29 LAB
ABSOLUTE IMMATURE GRANULOCYTES (OFFPRE24): NORMAL
ALBUMIN SERPL-MCNC: 3.7 G/DL
ALBUMIN SERPL-MCNC: 3.7 G/DL (ref 3.4–5)
ALBUMIN/GLOB SERPL: 1.1 {RATIO}
ALBUMIN/GLOB SERPL: 1.1 {RATIO} (ref 1–2)
ALP LIVER SERPL-CCNC: 86 U/L (ref 46–118)
ALP SERPL-CCNC: 86 U/L
ALT SERPL-CCNC: 19 U/L
ALT SERPL-CCNC: 19 U/L (ref 13–56)
ANION GAP SERPL CALC-SCNC: 6 MMOL/L
ANION GAP SERPL CALC-SCNC: 6 MMOL/L (ref 0–18)
AST SERPL-CCNC: 14 U/L
AST SERPL-CCNC: 14 U/L (ref 15–37)
BASO+EOS+MONOS # BLD: NORMAL 10*3/UL
BASO+EOS+MONOS NFR BLD: NORMAL %
BASOPHILS # BLD AUTO: 0.01 X10(3) UL (ref 0–0.2)
BASOPHILS # BLD: NORMAL 10*3/UL
BASOPHILS NFR BLD AUTO: 0.2 %
BASOPHILS NFR BLD: NORMAL %
BILIRUB SERPL-MCNC: 0.5 MG/DL
BILIRUB SERPL-MCNC: 0.5 MG/DL (ref 0.1–2)
BUN BLD-MCNC: 14 MG/DL (ref 7–18)
BUN SERPL-MCNC: 14 MG/DL
BUN/CREAT SERPL: 17.9
BUN/CREAT SERPL: 17.9 (ref 10–20)
CALCIUM BLD-MCNC: 9.3 MG/DL (ref 8.5–10.1)
CALCIUM SERPL-MCNC: 9.3 MG/DL
CHLORIDE SERPL-SCNC: 108 MMOL/L
CHLORIDE SERPL-SCNC: 108 MMOL/L (ref 98–112)
CHOLEST SERPL-MCNC: 166 MG/DL
CHOLEST SMN-MCNC: 166 MG/DL (ref ?–200)
CHOLEST/HDLC SERPL: NORMAL {RATIO}
CO2 SERPL-SCNC: 29 MMOL/L
CO2 SERPL-SCNC: 29 MMOL/L (ref 21–32)
CREAT BLD-MCNC: 0.78 MG/DL (ref 0.55–1.02)
CREAT SERPL-MCNC: 0.78 MG/DL
DEPRECATED RDW RBC AUTO: 43.9 FL (ref 35.1–46.3)
DIFFERENTIAL METHOD BLD: NORMAL
EOSINOPHIL # BLD AUTO: 0.03 X10(3) UL (ref 0–0.7)
EOSINOPHIL # BLD: NORMAL 10*3/UL
EOSINOPHIL NFR BLD AUTO: 0.7 %
EOSINOPHIL NFR BLD: NORMAL %
ERYTHROCYTE [DISTWIDTH] IN BLOOD BY AUTOMATED COUNT: 12.8 % (ref 11–15)
ERYTHROCYTE [DISTWIDTH] IN BLOOD: NORMAL %
EST. AVERAGE GLUCOSE BLD GHB EST-MCNC: 105 MG/DL (ref 68–126)
GLOBULIN PLAS-MCNC: 3.4 G/DL (ref 2.8–4.4)
GLOBULIN SER-MCNC: 3.4 G/DL
GLUCOSE BLD-MCNC: 91 MG/DL (ref 70–99)
GLUCOSE SERPL-MCNC: 91 MG/DL
HBA1C MFR BLD HPLC: 5.3 % (ref ?–5.7)
HCT VFR BLD AUTO: 39.8 % (ref 35–48)
HCT VFR BLD CALC: 39.8 %
HDLC SERPL-MCNC: 55 MG/DL
HDLC SERPL-MCNC: 55 MG/DL (ref 40–59)
HGB BLD-MCNC: 13.2 G/DL
HGB BLD-MCNC: 13.2 G/DL (ref 12–16)
IMM GRANULOCYTES # BLD AUTO: 0.01 X10(3) UL (ref 0–1)
IMM GRANULOCYTES NFR BLD: 0.2 %
IMMATURE GRANULOCYTES (OFFPRE25): NORMAL
LDLC SERPL CALC-MCNC: 101 MG/DL
LDLC SERPL CALC-MCNC: 101 MG/DL (ref ?–100)
LENGTH OF FAST TIME PATIENT: NORMAL H
LENGTH OF FAST TIME PATIENT: NORMAL H
LYMPHOCYTES # BLD AUTO: 1.42 X10(3) UL (ref 1–4)
LYMPHOCYTES # BLD: NORMAL 10*3/UL
LYMPHOCYTES NFR BLD AUTO: 32.4 %
LYMPHOCYTES NFR BLD: NORMAL %
M PROTEIN MFR SERPL ELPH: 7.1 G/DL (ref 6.4–8.2)
MCH RBC QN AUTO: 31.1 PG (ref 26–34)
MCH RBC QN AUTO: NORMAL PG
MCHC RBC AUTO-ENTMCNC: 33.2 G/DL (ref 31–37)
MCHC RBC AUTO-ENTMCNC: NORMAL G/DL
MCV RBC AUTO: 93.9 FL (ref 80–100)
MCV RBC AUTO: NORMAL FL
MONOCYTES # BLD AUTO: 0.41 X10(3) UL (ref 0.1–1)
MONOCYTES # BLD: NORMAL 10*3/UL
MONOCYTES NFR BLD AUTO: 9.4 %
MONOCYTES NFR BLD: NORMAL %
MPV (OFFPRE2): NORMAL
NEUTROPHILS # BLD AUTO: 2.5 X10 (3) UL (ref 1.5–7.7)
NEUTROPHILS # BLD AUTO: 2.5 X10(3) UL (ref 1.5–7.7)
NEUTROPHILS # BLD: NORMAL 10*3/UL
NEUTROPHILS NFR BLD AUTO: 57.1 %
NEUTROPHILS NFR BLD: NORMAL %
NONHDLC SERPL-MCNC: 111 MG/DL
NONHDLC SERPL-MCNC: 111 MG/DL (ref ?–130)
NRBC BLD MANUAL-RTO: NORMAL %
OSMOLALITY SERPL CALC.SUM OF ELEC: 296 MOSM/KG (ref 275–295)
PATIENT FASTING: YES
PATIENT FASTING: YES
PLAT MORPH BLD: NORMAL
PLATELET # BLD AUTO: 178 10(3)UL (ref 150–450)
PLATELET # BLD: 178 10*3/UL
POTASSIUM SERPL-SCNC: 4.2 MMOL/L
POTASSIUM SERPL-SCNC: 4.2 MMOL/L (ref 3.5–5.1)
PROT SERPL-MCNC: 7.1 G/DL
RBC # BLD AUTO: 4.24 X10(6)UL (ref 3.8–5.3)
RBC # BLD: 4.24 10*6/UL
RBC MORPH BLD: NORMAL
SODIUM SERPL-SCNC: 143 MMOL/L
SODIUM SERPL-SCNC: 143 MMOL/L (ref 136–145)
TRIGL SERPL-MCNC: 52 MG/DL
TRIGL SERPL-MCNC: 52 MG/DL (ref 30–149)
TSH SERPL-ACNC: 0.97 M[IU]/L
TSI SER-ACNC: 0.97 MIU/ML (ref 0.36–3.74)
VLDLC SERPL CALC-MCNC: 10 MG/DL
VLDLC SERPL CALC-MCNC: 10 MG/DL (ref 0–30)
WBC # BLD AUTO: 4.4 X10(3) UL (ref 4–11)
WBC # BLD: 4.4 10*3/UL
WBC MORPH BLD: NORMAL

## 2019-06-29 PROCEDURE — 84443 ASSAY THYROID STIM HORMONE: CPT

## 2019-06-29 PROCEDURE — 83036 HEMOGLOBIN GLYCOSYLATED A1C: CPT

## 2019-06-29 PROCEDURE — 77067 SCR MAMMO BI INCL CAD: CPT | Performed by: OBSTETRICS & GYNECOLOGY

## 2019-06-29 PROCEDURE — 80053 COMPREHEN METABOLIC PANEL: CPT

## 2019-06-29 PROCEDURE — 77063 BREAST TOMOSYNTHESIS BI: CPT | Performed by: OBSTETRICS & GYNECOLOGY

## 2019-06-29 PROCEDURE — 85025 COMPLETE CBC W/AUTO DIFF WBC: CPT

## 2019-06-29 PROCEDURE — 36415 COLL VENOUS BLD VENIPUNCTURE: CPT

## 2019-06-29 PROCEDURE — 81001 URINALYSIS AUTO W/SCOPE: CPT | Performed by: INTERNAL MEDICINE

## 2019-06-29 PROCEDURE — 80061 LIPID PANEL: CPT

## 2019-07-01 ENCOUNTER — TELEPHONE (OUTPATIENT)
Dept: INTERNAL MEDICINE CLINIC | Facility: CLINIC | Age: 59
End: 2019-07-01

## 2019-07-01 DIAGNOSIS — R82.90 ABNORMAL URINALYSIS: Primary | ICD-10-CM

## 2019-07-01 NOTE — TELEPHONE ENCOUNTER
Please let patient know that I thought her labs came out satisfactory. Blood sugar and hemoglobin A1c were good. LDL cholesterol was 101. She had a small amount of blood on dipstick but nothing under the microscope.   This is usually not a problem since

## 2019-07-01 NOTE — TELEPHONE ENCOUNTER
Spoke with Mirta to relay MD message below. Pt voiced understanding. Copy of labs and daughters labs sent out via [address] [facility].

## 2019-07-27 ENCOUNTER — HOSPITAL ENCOUNTER (OUTPATIENT)
Dept: CV DIAGNOSTICS | Facility: HOSPITAL | Age: 59
Discharge: HOME | End: 2019-07-27
Attending: INTERNAL MEDICINE
Payer: COMMERCIAL

## 2019-07-27 DIAGNOSIS — I35.1 AORTIC INSUFFICIENCY: ICD-10-CM

## 2019-07-27 PROCEDURE — 93306 TTE W/DOPPLER COMPLETE: CPT | Performed by: INTERNAL MEDICINE

## 2019-08-01 ENCOUNTER — TELEPHONE (OUTPATIENT)
Dept: CARDIOLOGY | Age: 59
End: 2019-08-01

## 2019-08-02 DIAGNOSIS — I35.1 AORTIC VALVE INSUFFICIENCY, ETIOLOGY OF CARDIAC VALVE DISEASE UNSPECIFIED: ICD-10-CM

## 2019-09-01 RX ORDER — LEVOTHYROXINE SODIUM 0.07 MG/1
TABLET ORAL
Qty: 90 TABLET | Refills: 3 | Status: SHIPPED | OUTPATIENT
Start: 2019-09-01 | End: 2020-05-29

## 2019-10-03 ENCOUNTER — TELEPHONE (OUTPATIENT)
Dept: CARDIOLOGY | Age: 59
End: 2019-10-03

## 2019-11-08 RX ORDER — ENALAPRIL MALEATE 2.5 MG/1
TABLET ORAL
Qty: 90 TABLET | Refills: 0 | Status: SHIPPED | OUTPATIENT
Start: 2019-11-08 | End: 2020-02-19 | Stop reason: SDUPTHER

## 2019-11-20 ENCOUNTER — TELEPHONE (OUTPATIENT)
Dept: INTERNAL MEDICINE CLINIC | Facility: CLINIC | Age: 59
End: 2019-11-20

## 2019-11-20 DIAGNOSIS — I35.1 NONRHEUMATIC AORTIC VALVE INSUFFICIENCY: Primary | ICD-10-CM

## 2019-11-20 NOTE — TELEPHONE ENCOUNTER
Patient needs a referral to see Dr. Lillian Garcia for a check & to review U/S and heart valve problem. Patient has an appointment on 11/25. Make sure it is faxed to Dr. Lillian Garcia.       Patient can be reached at:  [phone]

## 2019-11-25 ENCOUNTER — APPOINTMENT (OUTPATIENT)
Dept: CARDIOLOGY | Age: 59
End: 2019-11-25

## 2019-12-20 ENCOUNTER — HOSPITAL ENCOUNTER (OUTPATIENT)
Dept: GENERAL RADIOLOGY | Facility: HOSPITAL | Age: 59
Discharge: HOME | End: 2019-12-20
Attending: INTERNAL MEDICINE
Payer: COMMERCIAL

## 2019-12-20 ENCOUNTER — TELEPHONE (OUTPATIENT)
Dept: INTERNAL MEDICINE CLINIC | Facility: CLINIC | Age: 59
End: 2019-12-20

## 2019-12-20 ENCOUNTER — OFFICE VISIT (OUTPATIENT)
Dept: INTERNAL MEDICINE CLINIC | Facility: CLINIC | Age: 59
End: 2019-12-20

## 2019-12-20 VITALS
DIASTOLIC BLOOD PRESSURE: 74 MMHG | OXYGEN SATURATION: 98 % | BODY MASS INDEX: 23.67 KG/M2 | HEIGHT: 66.5 IN | WEIGHT: 149 LBS | SYSTOLIC BLOOD PRESSURE: 120 MMHG | HEART RATE: 85 BPM

## 2019-12-20 DIAGNOSIS — R10.9 FLANK PAIN: Primary | ICD-10-CM

## 2019-12-20 DIAGNOSIS — R07.81 RIB PAIN: ICD-10-CM

## 2019-12-20 DIAGNOSIS — R10.9 FLANK PAIN: ICD-10-CM

## 2019-12-20 PROCEDURE — 71101 X-RAY EXAM UNILAT RIBS/CHEST: CPT | Performed by: INTERNAL MEDICINE

## 2019-12-20 PROCEDURE — 99213 OFFICE O/P EST LOW 20 MIN: CPT | Performed by: INTERNAL MEDICINE

## 2019-12-20 RX ORDER — METHOCARBAMOL 500 MG/1
500 TABLET, FILM COATED ORAL 3 TIMES DAILY
Qty: 30 TABLET | Refills: 0 | Status: SHIPPED | OUTPATIENT
Start: 2019-12-20 | End: 2020-06-10 | Stop reason: ALTCHOICE

## 2019-12-20 RX ORDER — PREDNISONE 20 MG/1
40 TABLET ORAL DAILY
Qty: 10 TABLET | Refills: 0 | Status: SHIPPED | OUTPATIENT
Start: 2019-12-20 | End: 2019-12-25

## 2019-12-20 NOTE — PROGRESS NOTES
Marco Florez is a 62year old female. Patient presents with:  Back Pain: pt c/o of sudden pain that started 2 weeks ago      HPI:   Marco Florez is a 62year old female who presents for: back pain    Sudden pain that started 2 weeks ago.  The pain in her trauma or injury  : denies dysuria, urinary frequency, fever. Denies sob/wheezing.        EXAM:   /74   Pulse 85   Ht 5' 6.5\" (1.689 m)   Wt 149 lb (67.6 kg)   SpO2 98%   BMI 23.69 kg/m²     GENERAL: well developed, well nourished, in no apparent

## 2019-12-24 ENCOUNTER — TELEPHONE (OUTPATIENT)
Dept: INTERNAL MEDICINE CLINIC | Facility: CLINIC | Age: 59
End: 2019-12-24

## 2019-12-26 NOTE — TELEPHONE ENCOUNTER
I spoke with patient. She says her back pain is still there, yesterday was very bad but today is better. She is taking medications as prescribed. She says it has improved from when she was in the office.  Invited patient to call back with any questions or c

## 2020-01-02 ENCOUNTER — TELEPHONE (OUTPATIENT)
Dept: INTERNAL MEDICINE CLINIC | Facility: CLINIC | Age: 60
End: 2020-01-02

## 2020-01-02 NOTE — TELEPHONE ENCOUNTER
Dr. Mar Back - donald - pt has appt with you tomorrow at 8:20. See below - Pt has taking advil, nyquil, delsym for cough. Pt confirms message below. Phlegm is sticky, greenish.

## 2020-01-02 NOTE — TELEPHONE ENCOUNTER
Pt is calling with cold like symptoms. Symptoms: coughing, sore throat, low-grade fever on and off, green, mucus, right ear is cracking almost like she has water in it, all the congestion in her head.      Pharmacy: [facility] [place] call back

## 2020-01-03 ENCOUNTER — OFFICE VISIT (OUTPATIENT)
Dept: INTERNAL MEDICINE CLINIC | Facility: CLINIC | Age: 60
End: 2020-01-03

## 2020-01-03 VITALS
DIASTOLIC BLOOD PRESSURE: 70 MMHG | SYSTOLIC BLOOD PRESSURE: 120 MMHG | OXYGEN SATURATION: 99 % | WEIGHT: 150.19 LBS | BODY MASS INDEX: 23.85 KG/M2 | HEART RATE: 78 BPM | TEMPERATURE: 99 F | HEIGHT: 66.5 IN

## 2020-01-03 DIAGNOSIS — I10 ESSENTIAL HYPERTENSION: ICD-10-CM

## 2020-01-03 DIAGNOSIS — R05.9 COUGH: Primary | ICD-10-CM

## 2020-01-03 DIAGNOSIS — E03.9 ACQUIRED HYPOTHYROIDISM: ICD-10-CM

## 2020-01-03 PROCEDURE — 99213 OFFICE O/P EST LOW 20 MIN: CPT | Performed by: INTERNAL MEDICINE

## 2020-01-03 RX ORDER — PROMETHAZINE HYDROCHLORIDE AND CODEINE PHOSPHATE 6.25; 1 MG/5ML; MG/5ML
2.5 SYRUP ORAL EVERY 6 HOURS PRN
Qty: 118 ML | Refills: 0 | Status: SHIPPED | OUTPATIENT
Start: 2020-01-03 | End: 2020-05-29

## 2020-01-03 RX ORDER — AMOXICILLIN AND CLAVULANATE POTASSIUM 875; 125 MG/1; MG/1
1 TABLET, FILM COATED ORAL 2 TIMES DAILY
Qty: 20 TABLET | Refills: 0 | Status: SHIPPED | OUTPATIENT
Start: 2020-01-03 | End: 2020-01-13

## 2020-01-03 NOTE — PROGRESS NOTES
HPI:    Patient ID: Coleen Torres is a 62year old female. Patient presents with complaints of cough that started nearly 2 weeks ago. She has tried multiple over-the-counter medications but feels that symptoms are actually worsening.   She feels as if Application topically daily. • Enalapril Maleate (VASOTEC) 2.5 MG Oral Tab Take 2.5 mg by mouth daily. • aspirin 81 MG Oral Tab Take 1 tablet by mouth daily. 0   • Multiple Vitamin Oral Tab Take 1 tablet by mouth daily.   0     Allergies:  Cheese management of hypothyroidism. No orders of the defined types were placed in this encounter.       Meds This Visit:  Requested Prescriptions     Signed Prescriptions Disp Refills   • Amoxicillin-Pot Clavulanate 875-125 MG Oral Tab 20 tablet 0     Sig: Take

## 2020-02-19 RX ORDER — ENALAPRIL MALEATE 2.5 MG/1
TABLET ORAL
Qty: 90 TABLET | Refills: 0 | Status: SHIPPED | OUTPATIENT
Start: 2020-02-19 | End: 2020-05-29

## 2020-05-12 ENCOUNTER — TELEPHONE (OUTPATIENT)
Dept: INTERNAL MEDICINE CLINIC | Facility: CLINIC | Age: 60
End: 2020-05-12

## 2020-05-12 DIAGNOSIS — I10 ESSENTIAL HYPERTENSION: ICD-10-CM

## 2020-05-12 DIAGNOSIS — R73.9 ELEVATED BLOOD SUGAR: ICD-10-CM

## 2020-05-12 DIAGNOSIS — E03.9 HYPOTHYROIDISM, UNSPECIFIED TYPE: ICD-10-CM

## 2020-05-12 DIAGNOSIS — Z00.00 ANNUAL PHYSICAL EXAM: Primary | ICD-10-CM

## 2020-05-13 RX ORDER — METRONIDAZOLE 10 MG/G
1 GEL TOPICAL DAILY
Qty: 60 G | Refills: 3 | Status: SHIPPED | OUTPATIENT
Start: 2020-05-13 | End: 2021-01-13

## 2020-05-13 NOTE — TELEPHONE ENCOUNTER
Last prescribed 2017    To FD, please call to schedule annual    To Dr. Shalonda Stroud, please advise on refills and labs

## 2020-05-14 RX ORDER — METHOCARBAMOL 500 MG/1
TABLET, FILM COATED ORAL
Qty: 30 TABLET | Refills: 0 | OUTPATIENT
Start: 2020-05-14

## 2020-05-19 RX ORDER — ENALAPRIL MALEATE 2.5 MG/1
TABLET ORAL
Qty: 90 TABLET | Refills: 0 | OUTPATIENT
Start: 2020-05-19

## 2020-05-22 ENCOUNTER — LAB ENCOUNTER (OUTPATIENT)
Dept: LAB | Facility: HOSPITAL | Age: 60
End: 2020-05-22
Attending: INTERNAL MEDICINE
Payer: COMMERCIAL

## 2020-05-22 DIAGNOSIS — I10 ESSENTIAL HYPERTENSION: ICD-10-CM

## 2020-05-22 DIAGNOSIS — R73.9 ELEVATED BLOOD SUGAR: ICD-10-CM

## 2020-05-22 DIAGNOSIS — E03.9 HYPOTHYROIDISM, UNSPECIFIED TYPE: ICD-10-CM

## 2020-05-22 DIAGNOSIS — Z00.00 ANNUAL PHYSICAL EXAM: ICD-10-CM

## 2020-05-22 LAB
ALBUMIN SERPL-MCNC: 3.6 G/DL
ALBUMIN/GLOB SERPL: 1 {RATIO}
ALP SERPL-CCNC: 84 U/L
ALT SERPL-CCNC: 22 UNITS/L
ANION GAP SERPL CALC-SCNC: 4 MMOL/L
AST SERPL-CCNC: 21 UNITS/L
BILIRUB SERPL-MCNC: 0.6 MG/DL
BUN SERPL-MCNC: 14 MG/DL
BUN/CREAT SERPL: 18.4
CALCIUM SERPL-MCNC: 8.8 MG/DL
CHLORIDE SERPL-SCNC: 106 MMOL/L
CHOLEST SERPL-MCNC: 171 MG/DL
CO2 SERPL-SCNC: 30 MMOL/L
CREAT SERPL-MCNC: 0.76 MG/DL
ERYTHROCYTE [DISTWIDTH] IN BLOOD BY AUTOMATED COUNT: 43.8 %
ESTIMATED AVERAGE GLUCOSE: 108
GLOBULIN SER-MCNC: 3.7 G/DL
GLUCOSE SERPL-MCNC: 92 MG/DL
HBA1C MFR BLD: 5.4 %
HCT VFR BLD CALC: 41.3 %
HDLC SERPL-MCNC: 57 MG/DL
HGB BLD-MCNC: 13.7 G/DL
LDLC SERPL CALC-MCNC: 103 MG/DL
MCH RBC QN AUTO: 31.1 PG
MCHC RBC AUTO-ENTMCNC: 33.2 G/DL
MCV RBC AUTO: 93.9 FL
NONHDLC SERPL-MCNC: 114 MG/DL
PLATELET # BLD: 171 K/MCL
POTASSIUM SERPL-SCNC: 3.9 MMOL/L
PROT SERPL-MCNC: 7.3 G/DL
RBC # BLD: 4.4 10*6/UL
SODIUM SERPL-SCNC: 140 MMOL/L
TRIGL SERPL-MCNC: 55 MG/DL
TSH SERPL-ACNC: 1.92 MCUNITS/ML
VLDLC SERPL CALC-MCNC: 11 MG/DL
WBC # BLD: 5.1 K/MCL

## 2020-05-22 PROCEDURE — 85025 COMPLETE CBC W/AUTO DIFF WBC: CPT

## 2020-05-22 PROCEDURE — 83036 HEMOGLOBIN GLYCOSYLATED A1C: CPT

## 2020-05-22 PROCEDURE — 81001 URINALYSIS AUTO W/SCOPE: CPT

## 2020-05-22 PROCEDURE — 80061 LIPID PANEL: CPT

## 2020-05-22 PROCEDURE — 80053 COMPREHEN METABOLIC PANEL: CPT

## 2020-05-22 PROCEDURE — 84443 ASSAY THYROID STIM HORMONE: CPT

## 2020-05-22 PROCEDURE — 36415 COLL VENOUS BLD VENIPUNCTURE: CPT

## 2020-05-29 ENCOUNTER — TELEPHONE (OUTPATIENT)
Dept: INTERNAL MEDICINE CLINIC | Facility: CLINIC | Age: 60
End: 2020-05-29

## 2020-05-29 ENCOUNTER — OFFICE VISIT (OUTPATIENT)
Dept: INTERNAL MEDICINE CLINIC | Facility: CLINIC | Age: 60
End: 2020-05-29

## 2020-05-29 VITALS
HEART RATE: 74 BPM | HEIGHT: 66.5 IN | TEMPERATURE: 98 F | SYSTOLIC BLOOD PRESSURE: 140 MMHG | DIASTOLIC BLOOD PRESSURE: 80 MMHG | BODY MASS INDEX: 23.98 KG/M2 | WEIGHT: 151 LBS | OXYGEN SATURATION: 98 %

## 2020-05-29 DIAGNOSIS — Z01.419 ROUTINE GYNECOLOGICAL EXAMINATION: ICD-10-CM

## 2020-05-29 DIAGNOSIS — E03.9 ACQUIRED HYPOTHYROIDISM: ICD-10-CM

## 2020-05-29 DIAGNOSIS — Z00.00 ANNUAL PHYSICAL EXAM: Primary | ICD-10-CM

## 2020-05-29 DIAGNOSIS — M54.2 NECK PAIN ON LEFT SIDE: ICD-10-CM

## 2020-05-29 DIAGNOSIS — I10 ESSENTIAL HYPERTENSION: ICD-10-CM

## 2020-05-29 DIAGNOSIS — D22.9 NUMEROUS SKIN MOLES: ICD-10-CM

## 2020-05-29 DIAGNOSIS — I35.1 NONRHEUMATIC AORTIC VALVE INSUFFICIENCY: ICD-10-CM

## 2020-05-29 DIAGNOSIS — G89.29 CHRONIC LOW BACK PAIN WITHOUT SCIATICA, UNSPECIFIED BACK PAIN LATERALITY: ICD-10-CM

## 2020-05-29 DIAGNOSIS — E03.9 HYPOTHYROIDISM, UNSPECIFIED TYPE: ICD-10-CM

## 2020-05-29 DIAGNOSIS — M54.50 CHRONIC LOW BACK PAIN WITHOUT SCIATICA, UNSPECIFIED BACK PAIN LATERALITY: ICD-10-CM

## 2020-05-29 PROCEDURE — 99396 PREV VISIT EST AGE 40-64: CPT | Performed by: INTERNAL MEDICINE

## 2020-05-29 RX ORDER — ENALAPRIL MALEATE 2.5 MG/1
2.5 TABLET ORAL DAILY
Qty: 90 TABLET | Refills: 3 | Status: SHIPPED | OUTPATIENT
Start: 2020-05-29 | End: 2020-10-01

## 2020-05-29 RX ORDER — TRAMADOL HYDROCHLORIDE 50 MG/1
50 TABLET ORAL EVERY 8 HOURS PRN
Qty: 30 TABLET | Refills: 0 | Status: SHIPPED | OUTPATIENT
Start: 2020-05-29 | End: 2021-07-19

## 2020-05-29 RX ORDER — ENALAPRIL MALEATE 2.5 MG/1
TABLET ORAL
Qty: 90 TABLET | Refills: 0 | Status: SHIPPED | OUTPATIENT
Start: 2020-05-29 | End: 2020-09-01

## 2020-05-29 RX ORDER — LEVOTHYROXINE SODIUM 0.07 MG/1
75 TABLET ORAL DAILY
Qty: 90 TABLET | Refills: 3 | Status: SHIPPED | OUTPATIENT
Start: 2020-05-29 | End: 2020-08-31

## 2020-05-29 NOTE — TELEPHONE ENCOUNTER
To Dr. Misha Pisano----    Patient is calling to inform you her work is requiring her to have a work letter stating clearly what her diagnosis is and that she is unable to return to work until she see's a specialist. Patient reports you and her discussed this in

## 2020-05-29 NOTE — TELEPHONE ENCOUNTER
Message noted. Please ask Mirta if she can give you the exact wording as I am running behind and it would help me if she can just tell us what she would like me to put in the letter with her exact words and I will do so.

## 2020-05-29 NOTE — TELEPHONE ENCOUNTER
I dictated a letter. I did not sign off on letter so I can edit it if patient wishes.   You can read the letter to patient and then if she  approves I can sign off and have it ready to fax Monday when she calls us

## 2020-05-29 NOTE — TELEPHONE ENCOUNTER
YULISSA to Dr. Tamia Marquez----    Patient reports she approves of letter. She will call Monday with 's name.

## 2020-05-29 NOTE — TELEPHONE ENCOUNTER
To Dr. Mary Sylvester---    Patient would like letter to state her diagnosis of back injury with lower back pain and neck pain. She does not want MD to mention how she injured her back.  She also requests letter to specify that she cannot return to work until she h

## 2020-05-29 NOTE — PROGRESS NOTES
HPI:   Kandi Michelle is a 62year old female presents with the following problems. Patient is here for physical.    She has chronic low back pain. This is been a chronic problem.   However doing some gardening and lifting some pots and heavy material h 2 brown moles that meter. She will see Dr. Philippe Feliciano for these along with other routine checks for other moles. Family history reviewed briefly indicating that she stated her maternal grandmother had colon cancer at age 80. Mother had polyps.   No maligna Mother      Results for orders placed or performed in visit on 55/02/96   COMP METABOLIC PANEL (14)   Result Value Ref Range    Glucose 92 70 - 99 mg/dL    Sodium 140 136 - 145 mmol/L    Potassium 3.9 3.5 - 5.1 mmol/L    Chloride 106 98 - 112 mmol/L    CO2 Value Ref Range    WBC 5.1 4.0 - 11.0 x10(3) uL    RBC 4.40 3.80 - 5.30 x10(6)uL    HGB 13.7 12.0 - 16.0 g/dL    HCT 41.3 35.0 - 48.0 %    MCV 93.9 80.0 - 100.0 fL    MCH 31.1 26.0 - 34.0 pg    MCHC 33.2 31.0 - 37.0 g/dL    RDW-SD 43.8 35.1 - 46.3 fL    RD Dr. Lian Musa. Last mammogram was April 22, 2019. LUNGS:  clear to auscultation. Effort normal  CARDIO:  RRR without murmur. S1 and S2 normal. No carotid bruit's. Carotid pulsed 1 + B/L. Specifically there is no pain over the carotid artery itself.   Some p

## 2020-06-02 NOTE — TELEPHONE ENCOUNTER
S. w Patient. Letter needs to include that she has an upcoming appointment with back specialist Dr. Marah Douglas om 6/10/20. She also questions about back wording in the letter, should the letter state back pain or back injury?     To Dr. Corinna Barclay:     Call Daniela Reyna

## 2020-06-02 NOTE — TELEPHONE ENCOUNTER
Pt. Is calling to provide additional info for her letter    [facility]   Fax # [phone]  Pt. Would like a call back she has additional info to add   Ph.  # [phone]   Routed to clinical

## 2020-06-02 NOTE — TELEPHONE ENCOUNTER
Message noted. I can regenerate letter to include what she is asking.   I am not sure what she would want me to put in the letter that I can put either back pain or back injury whenever she would want me to put there is there will probably need to be docum

## 2020-06-03 NOTE — TELEPHONE ENCOUNTER
To Dr. Yanely Combs----    Spoke with patient who reports she would like letter changed to say Arlyn Franks should not return to work until evaluation by a back specialist. This is for a back injury that is causing neck and lower back pain.  Patient has an appo

## 2020-06-03 NOTE — TELEPHONE ENCOUNTER
YULISSA to Dr. Luis Alamo----    Pt reports she was lifting a heavy planter pot and also was gardening and believes that is how she re-injured her back. Letter faxed; fax confirmation received.

## 2020-06-03 NOTE — TELEPHONE ENCOUNTER
Letter generated. I believe I have it in the way she wants. Please check. I think I did print on printer in my office. We can go ahead and fax it.   Please let patient know we did this and just ask her briefly that I wanted to document the nature of her

## 2020-06-10 ENCOUNTER — OFFICE VISIT (OUTPATIENT)
Dept: NEUROLOGY | Facility: CLINIC | Age: 60
End: 2020-06-10

## 2020-06-10 ENCOUNTER — TELEPHONE (OUTPATIENT)
Dept: NEUROLOGY | Facility: CLINIC | Age: 60
End: 2020-06-10

## 2020-06-10 DIAGNOSIS — M47.816 LUMBAR SPONDYLOSIS: Primary | ICD-10-CM

## 2020-06-10 PROCEDURE — 99244 OFF/OP CNSLTJ NEW/EST MOD 40: CPT | Performed by: PHYSICAL MEDICINE & REHABILITATION

## 2020-06-10 NOTE — PROGRESS NOTES
Letter for work duty restrictions faxed to pt's [address] [employer]: Socrates Pham () at [phone]  Phone: [phone]

## 2020-06-10 NOTE — PROGRESS NOTES
[address]  Progress Note    CHIEF COMPLAINT:  Patient presents with:  Low Back Pain: New pt presents for constant, bilateral LBP (Right > Left), has a long hx of LBP but over Memorial Day weekend, was lifti Social History    Occupational History      Not on file    Tobacco Use      Smoking status: Never Smoker      Smokeless tobacco: Never Used    Substance and Sexual Activity      Alcohol use: No        Alcohol/week: 0.0 standard drinks      Drug use:  No denies  Cold Extremities: denies   Skin  Open Sores: denies  Nodules or Lumps: denies  Rash: denies   Neurological  Loss of Strength Since last Visit: denies  Tingling/Numbness: denies  Balance: denies   Psychiatric  Anxiety: denies  Depressed Mood: denies recommending PT to follow-up with a facet injections.   Given the nature of her work, I think it is reasonable for her to be off for a few weeks while she recovers from her back condition.  - SPECIALTY (OTHER) - INTERNAL  - XR LUMBAR SPINE (MIN 2 VIEWS) (CP

## 2020-06-11 RX ORDER — MELOXICAM 15 MG/1
15 TABLET ORAL DAILY
Qty: 30 TABLET | Refills: 0 | Status: SHIPPED | OUTPATIENT
Start: 2020-06-11 | End: 2021-07-19

## 2020-06-11 RX ORDER — CYCLOBENZAPRINE HCL 10 MG
10 TABLET ORAL NIGHTLY
Qty: 30 TABLET | Refills: 0 | Status: SHIPPED | OUTPATIENT
Start: 2020-06-11 | End: 2020-07-11

## 2020-06-11 NOTE — TELEPHONE ENCOUNTER
Spoke to patient and notified her of below. She was understanding. She states that Dr. Khalif Ortez was going to prescribe two medications but they are not at the pharmacy. Please advise.

## 2020-06-16 ENCOUNTER — TELEPHONE (OUTPATIENT)
Dept: NEUROLOGY | Facility: CLINIC | Age: 60
End: 2020-06-16

## 2020-06-16 NOTE — TELEPHONE ENCOUNTER
Received in basket from [email or web address] [email or web address]  advising of approval for Bilateral L3-4, L4-5 and L5-S1 facet injections for one unit/DOS. Will  inform Nursing.     Dr. Ariel Chan please advise on provider scheduling

## 2020-06-17 NOTE — TELEPHONE ENCOUNTER
Spoke to patient. Scheduled her for  Bilateral L34, L45 and L5-S1 facet injections on Thursday, June 25, 2020. Advised to hold meloxicam 3 days prior to procedure. She will need a ride the day of procedure.      Patient states that she would like to get tw

## 2020-06-23 ENCOUNTER — MED REC SCAN ONLY (OUTPATIENT)
Dept: NEUROLOGY | Facility: CLINIC | Age: 60
End: 2020-06-23

## 2020-06-23 ENCOUNTER — LAB REQUISITION (OUTPATIENT)
Dept: LAB | Facility: HOSPITAL | Age: 60
End: 2020-06-23
Payer: COMMERCIAL

## 2020-06-23 DIAGNOSIS — Z20.828 CONTACT WITH AND (SUSPECTED) EXPOSURE TO OTHER VIRAL COMMUNICABLE DISEASES: ICD-10-CM

## 2020-06-25 ENCOUNTER — OFFICE VISIT (OUTPATIENT)
Dept: SURGERY | Facility: CLINIC | Age: 60
End: 2020-06-25

## 2020-06-25 DIAGNOSIS — M47.816 LUMBAR SPONDYLOSIS: Primary | ICD-10-CM

## 2020-06-25 PROCEDURE — 64494 INJ PARAVERT F JNT L/S 2 LEV: CPT | Performed by: PHYSICAL MEDICINE & REHABILITATION

## 2020-06-25 PROCEDURE — 64493 INJ PARAVERT F JNT L/S 1 LEV: CPT | Performed by: PHYSICAL MEDICINE & REHABILITATION

## 2020-06-25 PROCEDURE — 99153 MOD SED SAME PHYS/QHP EA: CPT | Performed by: PHYSICAL MEDICINE & REHABILITATION

## 2020-06-25 PROCEDURE — 64495 INJ PARAVERT F JNT L/S 3 LEV: CPT | Performed by: PHYSICAL MEDICINE & REHABILITATION

## 2020-06-25 PROCEDURE — 99152 MOD SED SAME PHYS/QHP 5/>YRS: CPT | Performed by: PHYSICAL MEDICINE & REHABILITATION

## 2020-06-26 ENCOUNTER — TELEPHONE (OUTPATIENT)
Dept: NEUROLOGY | Facility: CLINIC | Age: 60
End: 2020-06-26

## 2020-06-26 NOTE — TELEPHONE ENCOUNTER
Spoke to patient so we could schedule a  2 week f/u however she was at lunch with a group of people and stated she would call us back on Monday.   CM 6-26 @2:33pm

## 2020-06-30 RX ORDER — MELOXICAM 15 MG/1
15 TABLET ORAL DAILY
COMMUNITY
Start: 2020-06-11

## 2020-06-30 RX ORDER — CYCLOBENZAPRINE HCL 10 MG
10 TABLET ORAL DAILY PRN
COMMUNITY
Start: 2020-06-11 | End: 2020-07-11

## 2020-06-30 RX ORDER — METRONIDAZOLE 10 MG/G
GEL TOPICAL DAILY
COMMUNITY
Start: 2020-05-13

## 2020-06-30 RX ORDER — TRAMADOL HYDROCHLORIDE 50 MG/1
50 TABLET ORAL EVERY 8 HOURS PRN
COMMUNITY
Start: 2020-05-29 | End: 2020-07-02

## 2020-07-02 ENCOUNTER — OFFICE VISIT (OUTPATIENT)
Dept: CARDIOLOGY | Age: 60
End: 2020-07-02

## 2020-07-02 VITALS
WEIGHT: 145 LBS | SYSTOLIC BLOOD PRESSURE: 134 MMHG | OXYGEN SATURATION: 97 % | BODY MASS INDEX: 22.76 KG/M2 | HEIGHT: 67 IN | HEART RATE: 99 BPM | DIASTOLIC BLOOD PRESSURE: 76 MMHG

## 2020-07-02 DIAGNOSIS — I35.1 NONRHEUMATIC AORTIC VALVE INSUFFICIENCY: Primary | ICD-10-CM

## 2020-07-02 DIAGNOSIS — I10 ESSENTIAL HYPERTENSION: ICD-10-CM

## 2020-07-02 PROCEDURE — 3075F SYST BP GE 130 - 139MM HG: CPT | Performed by: INTERNAL MEDICINE

## 2020-07-02 PROCEDURE — 3078F DIAST BP <80 MM HG: CPT | Performed by: INTERNAL MEDICINE

## 2020-07-02 PROCEDURE — 99214 OFFICE O/P EST MOD 30 MIN: CPT | Performed by: INTERNAL MEDICINE

## 2020-07-02 RX ORDER — ASPIRIN 81 MG/1
1 TABLET, CHEWABLE ORAL DAILY
COMMUNITY
End: 2020-07-02 | Stop reason: SDUPTHER

## 2020-07-02 SDOH — HEALTH STABILITY: MENTAL HEALTH: HOW OFTEN DO YOU HAVE A DRINK CONTAINING ALCOHOL?: NOT ASKED

## 2020-07-02 ASSESSMENT — PATIENT HEALTH QUESTIONNAIRE - PHQ9
CLINICAL INTERPRETATION OF PHQ2 SCORE: NO FURTHER SCREENING NEEDED
CLINICAL INTERPRETATION OF PHQ9 SCORE: NO FURTHER SCREENING NEEDED
1. LITTLE INTEREST OR PLEASURE IN DOING THINGS: NOT AT ALL
SUM OF ALL RESPONSES TO PHQ9 QUESTIONS 1 AND 2: 0
SUM OF ALL RESPONSES TO PHQ9 QUESTIONS 1 AND 2: 0
2. FEELING DOWN, DEPRESSED OR HOPELESS: NOT AT ALL

## 2020-08-12 ENCOUNTER — TELEPHONE (OUTPATIENT)
Dept: INTERNAL MEDICINE CLINIC | Facility: CLINIC | Age: 60
End: 2020-08-12

## 2020-08-12 DIAGNOSIS — Z01.419 ROUTINE GYNECOLOGICAL EXAMINATION: Primary | ICD-10-CM

## 2020-08-12 DIAGNOSIS — Z12.31 ENCOUNTER FOR SCREENING MAMMOGRAM FOR MALIGNANT NEOPLASM OF BREAST: ICD-10-CM

## 2020-08-12 DIAGNOSIS — Z12.31 VISIT FOR SCREENING MAMMOGRAM: ICD-10-CM

## 2020-08-12 NOTE — TELEPHONE ENCOUNTER
Spoke to patient and relayed MD message, patient verbalized understanding. Patient reports she already has number to central scheduling.

## 2020-08-18 ENCOUNTER — HOSPITAL ENCOUNTER (OUTPATIENT)
Dept: MAMMOGRAPHY | Facility: HOSPITAL | Age: 60
Discharge: HOME | End: 2020-08-18
Attending: INTERNAL MEDICINE
Payer: COMMERCIAL

## 2020-08-18 DIAGNOSIS — Z12.31 ENCOUNTER FOR SCREENING MAMMOGRAM FOR MALIGNANT NEOPLASM OF BREAST: ICD-10-CM

## 2020-08-18 DIAGNOSIS — Z12.31 VISIT FOR SCREENING MAMMOGRAM: ICD-10-CM

## 2020-08-18 PROCEDURE — 77067 SCR MAMMO BI INCL CAD: CPT | Performed by: INTERNAL MEDICINE

## 2020-08-18 PROCEDURE — 77063 BREAST TOMOSYNTHESIS BI: CPT | Performed by: INTERNAL MEDICINE

## 2020-08-19 ENCOUNTER — TELEPHONE (OUTPATIENT)
Dept: INTERNAL MEDICINE CLINIC | Facility: CLINIC | Age: 60
End: 2020-08-19

## 2020-08-19 NOTE — TELEPHONE ENCOUNTER
Please let patient know her mammogram was good.  Does she follow up with gynecology for her breast exam? ( there is another for daughter Britton Au )

## 2020-08-19 NOTE — TELEPHONE ENCOUNTER
As FYI to DR. MASON - called patient and relayed DR. MASON message- verbalized understanding, Has DR. Simeon Hendricks as GYN

## 2020-08-31 RX ORDER — LEVOTHYROXINE SODIUM 0.07 MG/1
TABLET ORAL
Qty: 90 TABLET | Refills: 3 | Status: SHIPPED | OUTPATIENT
Start: 2020-08-31 | End: 2020-10-01

## 2020-09-01 RX ORDER — ENALAPRIL MALEATE 2.5 MG/1
TABLET ORAL
Qty: 90 TABLET | Refills: 0 | Status: SHIPPED | OUTPATIENT
Start: 2020-09-01

## 2020-10-01 RX ORDER — LEVOTHYROXINE SODIUM 0.07 MG/1
75 TABLET ORAL DAILY
Qty: 90 TABLET | Refills: 3 | Status: SHIPPED | OUTPATIENT
Start: 2020-10-01 | End: 2021-10-30

## 2020-10-01 RX ORDER — ENALAPRIL MALEATE 2.5 MG/1
2.5 TABLET ORAL DAILY
Qty: 90 TABLET | Refills: 3 | Status: SHIPPED | OUTPATIENT
Start: 2020-10-01 | End: 2021-10-30

## 2020-10-01 NOTE — TELEPHONE ENCOUNTER
Patient confirmed that she is now using [facility].     Refill request is for a maintenance medication and has met the criteria specified in the Ambulatory Medication Refill Standing Order for eligibility, visits, laboratory, alerts and was sent to the

## 2020-11-30 ENCOUNTER — IMMUNIZATION (OUTPATIENT)
Dept: INTERNAL MEDICINE CLINIC | Facility: CLINIC | Age: 60
End: 2020-11-30

## 2020-11-30 DIAGNOSIS — Z23 NEED FOR VACCINATION: ICD-10-CM

## 2020-11-30 PROCEDURE — 90471 IMMUNIZATION ADMIN: CPT | Performed by: INTERNAL MEDICINE

## 2020-11-30 PROCEDURE — 90686 IIV4 VACC NO PRSV 0.5 ML IM: CPT | Performed by: INTERNAL MEDICINE

## 2020-12-22 ENCOUNTER — HOSPITAL ENCOUNTER (OUTPATIENT)
Dept: CV DIAGNOSTICS | Facility: HOSPITAL | Age: 60
Discharge: HOME | End: 2020-12-22
Attending: INTERNAL MEDICINE
Payer: COMMERCIAL

## 2020-12-22 DIAGNOSIS — I35.1 NONRHEUMATIC AORTIC (VALVE) INSUFFICIENCY: ICD-10-CM

## 2020-12-22 DIAGNOSIS — I10 ESSENTIAL HYPERTENSION: ICD-10-CM

## 2020-12-22 PROCEDURE — 93306 TTE W/DOPPLER COMPLETE: CPT | Performed by: INTERNAL MEDICINE

## 2020-12-23 ENCOUNTER — TELEPHONE (OUTPATIENT)
Dept: INTERNAL MEDICINE CLINIC | Facility: CLINIC | Age: 60
End: 2020-12-23

## 2020-12-23 NOTE — TELEPHONE ENCOUNTER
Message noted. We can tell her report says she has moderate aortic regurgitation which she has had before. We can mail her report if she wishes.

## 2020-12-23 NOTE — TELEPHONE ENCOUNTER
Spoke with patient and relayed message from Dr. Omero Donaldson. Patient verbalized understanding and denied need for report to be mailed to her.

## 2020-12-23 NOTE — TELEPHONE ENCOUNTER
Spoke with patient re: message for her daughter. Patient also asking for ECHO results per Dr. Cesar Frias. Relayed, \"No significant change since prior study\" per report. She requests Dr. Erick Rodriguez review these results as well.  She is aware Dr. Erick Rodriguez is out of

## 2020-12-24 ENCOUNTER — TELEPHONE (OUTPATIENT)
Dept: CARDIOLOGY | Age: 60
End: 2020-12-24

## 2021-01-13 NOTE — TELEPHONE ENCOUNTER
MetroNIDAZOLE (METROGEL) 1 % External Gel    Patient is in [place] right now, would like medication sent to the [place] [address] in [place] [address].

## 2021-01-14 RX ORDER — METRONIDAZOLE 10 MG/G
1 GEL TOPICAL DAILY
Qty: 60 G | Refills: 1 | Status: SHIPPED | OUTPATIENT
Start: 2021-01-14

## 2021-01-14 NOTE — TELEPHONE ENCOUNTER
The following prescription was reviewed, authorized, and electronically sent to the pharmacy.     Requested Prescriptions     Signed Prescriptions Disp Refills   • metroNIDAZOLE (METROGEL) 1 % External Gel 60 g 1     Sig: Apply 1 Application topically daily

## 2021-06-25 ENCOUNTER — HOSPITAL ENCOUNTER (OUTPATIENT)
Dept: GENERAL RADIOLOGY | Facility: HOSPITAL | Age: 61
Discharge: HOME | End: 2021-06-25
Attending: INTERNAL MEDICINE
Payer: COMMERCIAL

## 2021-06-25 ENCOUNTER — TELEPHONE (OUTPATIENT)
Dept: INTERNAL MEDICINE CLINIC | Facility: CLINIC | Age: 61
End: 2021-06-25

## 2021-06-25 DIAGNOSIS — M65.341 TRIGGER FINGER, RIGHT RING FINGER: ICD-10-CM

## 2021-06-25 DIAGNOSIS — M65.341 TRIGGER FINGER, RIGHT RING FINGER: Primary | ICD-10-CM

## 2021-06-25 PROCEDURE — 73130 X-RAY EXAM OF HAND: CPT | Performed by: INTERNAL MEDICINE

## 2021-06-28 ENCOUNTER — TELEPHONE (OUTPATIENT)
Dept: INTERNAL MEDICINE CLINIC | Facility: CLINIC | Age: 61
End: 2021-06-28

## 2021-06-28 NOTE — TELEPHONE ENCOUNTER
Please let patient know that her hand x-ray showed some arthritis in the finger joints and a small joint in her wrist.  The arthritis in the fingers may be responsible for her \"trigger finger\".   I gave her contact information to Dr. Ally Mcfarland if she wis

## 2021-07-01 NOTE — TELEPHONE ENCOUNTER
Dr. Alfredo Posey message relayed to pt who verbalized understanding. Pt has appt with Dr. Milad Vila 7/19/21.

## 2021-07-19 ENCOUNTER — OFFICE VISIT (OUTPATIENT)
Dept: INTERNAL MEDICINE CLINIC | Facility: CLINIC | Age: 61
End: 2021-07-19

## 2021-07-19 ENCOUNTER — OFFICE VISIT (OUTPATIENT)
Dept: SURGERY | Facility: CLINIC | Age: 61
End: 2021-07-19

## 2021-07-19 VITALS
DIASTOLIC BLOOD PRESSURE: 70 MMHG | HEIGHT: 66.5 IN | OXYGEN SATURATION: 98 % | SYSTOLIC BLOOD PRESSURE: 100 MMHG | TEMPERATURE: 98 F | WEIGHT: 144.63 LBS | HEART RATE: 74 BPM | BODY MASS INDEX: 22.97 KG/M2

## 2021-07-19 VITALS — HEART RATE: 64 BPM | DIASTOLIC BLOOD PRESSURE: 75 MMHG | RESPIRATION RATE: 16 BRPM | SYSTOLIC BLOOD PRESSURE: 139 MMHG

## 2021-07-19 DIAGNOSIS — I35.1 NONRHEUMATIC AORTIC VALVE INSUFFICIENCY: ICD-10-CM

## 2021-07-19 DIAGNOSIS — Z01.419 ENCOUNTER FOR GYNECOLOGICAL EXAMINATION WITHOUT ABNORMAL FINDING: ICD-10-CM

## 2021-07-19 DIAGNOSIS — Z00.00 ANNUAL PHYSICAL EXAM: Primary | ICD-10-CM

## 2021-07-19 DIAGNOSIS — E03.9 ACQUIRED HYPOTHYROIDISM: ICD-10-CM

## 2021-07-19 DIAGNOSIS — M54.50 CHRONIC MIDLINE LOW BACK PAIN WITHOUT SCIATICA: ICD-10-CM

## 2021-07-19 DIAGNOSIS — R73.9 ELEVATED BLOOD SUGAR: ICD-10-CM

## 2021-07-19 DIAGNOSIS — M65.341 TRIGGER RING FINGER OF RIGHT HAND: Primary | ICD-10-CM

## 2021-07-19 DIAGNOSIS — I10 ESSENTIAL HYPERTENSION: ICD-10-CM

## 2021-07-19 DIAGNOSIS — G89.29 CHRONIC MIDLINE LOW BACK PAIN WITHOUT SCIATICA: ICD-10-CM

## 2021-07-19 PROCEDURE — 3074F SYST BP LT 130 MM HG: CPT | Performed by: INTERNAL MEDICINE

## 2021-07-19 PROCEDURE — 99396 PREV VISIT EST AGE 40-64: CPT | Performed by: INTERNAL MEDICINE

## 2021-07-19 PROCEDURE — 3078F DIAST BP <80 MM HG: CPT | Performed by: INTERNAL MEDICINE

## 2021-07-19 PROCEDURE — 96372 THER/PROPH/DIAG INJ SC/IM: CPT | Performed by: PLASTIC SURGERY

## 2021-07-19 PROCEDURE — 3008F BODY MASS INDEX DOCD: CPT | Performed by: INTERNAL MEDICINE

## 2021-07-19 PROCEDURE — 99243 OFF/OP CNSLTJ NEW/EST LOW 30: CPT | Performed by: PLASTIC SURGERY

## 2021-07-19 PROCEDURE — 3075F SYST BP GE 130 - 139MM HG: CPT | Performed by: PLASTIC SURGERY

## 2021-07-19 PROCEDURE — 3078F DIAST BP <80 MM HG: CPT | Performed by: PLASTIC SURGERY

## 2021-07-19 PROCEDURE — 20550 NJX 1 TENDON SHEATH/LIGAMENT: CPT | Performed by: PLASTIC SURGERY

## 2021-07-19 RX ORDER — BETAMETHASONE SODIUM PHOSPHATE AND BETAMETHASONE ACETATE 3; 3 MG/ML; MG/ML
6 INJECTION, SUSPENSION INTRA-ARTICULAR; INTRALESIONAL; INTRAMUSCULAR; SOFT TISSUE ONCE
Status: COMPLETED | OUTPATIENT
Start: 2021-07-19 | End: 2021-07-19

## 2021-07-19 RX ORDER — MELOXICAM 15 MG/1
TABLET ORAL
Qty: 30 TABLET | Refills: 0 | Status: SHIPPED | OUTPATIENT
Start: 2021-07-19

## 2021-07-19 RX ORDER — CYCLOBENZAPRINE HCL 10 MG
10 TABLET ORAL
COMMUNITY
Start: 2020-06-11

## 2021-07-19 RX ADMIN — BETAMETHASONE SODIUM PHOSPHATE AND BETAMETHASONE ACETATE 6 MG: 3; 3 INJECTION, SUSPENSION INTRA-ARTICULAR; INTRALESIONAL; INTRAMUSCULAR; SOFT TISSUE at 13:46:00

## 2021-07-19 NOTE — H&P
Tom Sorto is a 56year old female that presents with Patient presents with:  Pain: Right ring finger trigger   .     REFERRED BY:  Elise Ceron    Pacemaker: No  Latex Allergy: no  Coumadin: No  Plavix: No  Other anticoagulants: No  Cardiac stents: N MCG Oral Tab Take 1 tablet (75 mcg total) by mouth daily. 90 tablet 3   • aspirin 81 MG Oral Tab Take 1 tablet by mouth daily.   0   • Multiple Vitamin Oral Tab Take 1 tablet by mouth daily.  0        SOCIAL HISTORY  Social History    Tobacco Use      Smoki patient will make an appointment. INJECTION PROCEDURE NOTE     Procedure performed by: Elliott Young MD    Date: 7/19/2021    Procedural time-out required/obtained? Yes    PROCEDURE DETAILS:  Informed consent was obtained.     Questions answe

## 2021-07-19 NOTE — PROGRESS NOTES
HPI:   Kim Wing is a 56year old female presents with the following problems. Mirta presents for physical.    She does have a history of aortic insufficiency.   She had a echocardiogram December 2020 that showed ejection fraction 60 to 65% and mode mg total) by mouth daily. 90 tablet 3   • Levothyroxine Sodium 75 MCG Oral Tab Take 1 tablet (75 mcg total) by mouth daily. 90 tablet 3   • aspirin 81 MG Oral Tab Take 1 tablet by mouth daily. 0   • Multiple Vitamin Oral Tab Take 1 tablet by mouth daily. Atraumatic.   pharynx without exudate or erythema  EYES:  PERRL . conjunctiva clear. NECK:  supple, no adenopathy, thyroid normal  BREAST: Patient sees Dr. Daniella Villalobos. LUNGS:  clear to auscultation. Effort normal  CARDIO:  RRR without murmur.   S1 and S2 koko

## 2021-07-19 NOTE — PROGRESS NOTES
After evaluation by Dr. Alhaji Whatley, orders verified for 2 cc celestone injection to right ring finger  1cc/6mg Betamethasone and 1cc/10mg 1% Lidocaine drawn up into one syringe for injection. Consent obtained and witnessed, and time-out performed by RN.

## 2021-08-09 ENCOUNTER — TELEPHONE (OUTPATIENT)
Dept: INTERNAL MEDICINE CLINIC | Facility: CLINIC | Age: 61
End: 2021-08-09

## 2021-08-09 ENCOUNTER — LAB ENCOUNTER (OUTPATIENT)
Dept: LAB | Facility: HOSPITAL | Age: 61
End: 2021-08-09
Attending: INTERNAL MEDICINE
Payer: COMMERCIAL

## 2021-08-09 DIAGNOSIS — R73.9 ELEVATED BLOOD SUGAR: ICD-10-CM

## 2021-08-09 DIAGNOSIS — Z00.00 ANNUAL PHYSICAL EXAM: ICD-10-CM

## 2021-08-09 LAB
ALBUMIN SERPL-MCNC: 3.8 G/DL (ref 3.4–5)
ALBUMIN/GLOB SERPL: 1.1 {RATIO} (ref 1–2)
ALP LIVER SERPL-CCNC: 95 U/L
ALT SERPL-CCNC: 25 U/L
ANION GAP SERPL CALC-SCNC: 5 MMOL/L (ref 0–18)
AST SERPL-CCNC: 20 U/L (ref 15–37)
BASOPHILS # BLD AUTO: 0.01 X10(3) UL (ref 0–0.2)
BASOPHILS NFR BLD AUTO: 0.2 %
BILIRUB SERPL-MCNC: 0.8 MG/DL (ref 0.1–2)
BILIRUB UR QL: NEGATIVE
BUN BLD-MCNC: 9 MG/DL (ref 7–18)
BUN/CREAT SERPL: 12 (ref 10–20)
CALCIUM BLD-MCNC: 9.4 MG/DL (ref 8.5–10.1)
CHLORIDE SERPL-SCNC: 107 MMOL/L (ref 98–112)
CHOLEST SMN-MCNC: 205 MG/DL (ref ?–200)
CLARITY UR: CLEAR
CO2 SERPL-SCNC: 30 MMOL/L (ref 21–32)
COLOR UR: YELLOW
CREAT BLD-MCNC: 0.75 MG/DL
DEPRECATED RDW RBC AUTO: 45.1 FL (ref 35.1–46.3)
EOSINOPHIL # BLD AUTO: 0.04 X10(3) UL (ref 0–0.7)
EOSINOPHIL NFR BLD AUTO: 0.7 %
ERYTHROCYTE [DISTWIDTH] IN BLOOD BY AUTOMATED COUNT: 13.1 % (ref 11–15)
EST. AVERAGE GLUCOSE BLD GHB EST-MCNC: 105 MG/DL (ref 68–126)
GLOBULIN PLAS-MCNC: 3.5 G/DL (ref 2.8–4.4)
GLUCOSE BLD-MCNC: 92 MG/DL (ref 70–99)
GLUCOSE UR-MCNC: NEGATIVE MG/DL
HBA1C MFR BLD HPLC: 5.3 % (ref ?–5.7)
HCT VFR BLD AUTO: 42.3 %
HDLC SERPL-MCNC: 74 MG/DL (ref 40–59)
HGB BLD-MCNC: 13.8 G/DL
HGB UR QL STRIP.AUTO: NEGATIVE
IMM GRANULOCYTES # BLD AUTO: 0.02 X10(3) UL (ref 0–1)
IMM GRANULOCYTES NFR BLD: 0.4 %
KETONES UR-MCNC: NEGATIVE MG/DL
LDLC SERPL CALC-MCNC: 117 MG/DL (ref ?–100)
LEUKOCYTE ESTERASE UR QL STRIP.AUTO: NEGATIVE
LYMPHOCYTES # BLD AUTO: 1.49 X10(3) UL (ref 1–4)
LYMPHOCYTES NFR BLD AUTO: 27 %
M PROTEIN MFR SERPL ELPH: 7.3 G/DL (ref 6.4–8.2)
MCH RBC QN AUTO: 30.7 PG (ref 26–34)
MCHC RBC AUTO-ENTMCNC: 32.6 G/DL (ref 31–37)
MCV RBC AUTO: 94.2 FL
MONOCYTES # BLD AUTO: 0.49 X10(3) UL (ref 0.1–1)
MONOCYTES NFR BLD AUTO: 8.9 %
NEUTROPHILS # BLD AUTO: 3.46 X10 (3) UL (ref 1.5–7.7)
NEUTROPHILS # BLD AUTO: 3.46 X10(3) UL (ref 1.5–7.7)
NEUTROPHILS NFR BLD AUTO: 62.8 %
NITRITE UR QL STRIP.AUTO: NEGATIVE
NONHDLC SERPL-MCNC: 131 MG/DL (ref ?–130)
OSMOLALITY SERPL CALC.SUM OF ELEC: 292 MOSM/KG (ref 275–295)
PATIENT FASTING Y/N/NP: YES
PATIENT FASTING Y/N/NP: YES
PH UR: 7 [PH] (ref 5–8)
PLATELET # BLD AUTO: 196 10(3)UL (ref 150–450)
POTASSIUM SERPL-SCNC: 4 MMOL/L (ref 3.5–5.1)
PROT UR-MCNC: NEGATIVE MG/DL
RBC # BLD AUTO: 4.49 X10(6)UL
SODIUM SERPL-SCNC: 142 MMOL/L (ref 136–145)
SP GR UR STRIP: 1.01 (ref 1–1.03)
TRIGL SERPL-MCNC: 77 MG/DL (ref 30–149)
TSI SER-ACNC: 2.04 MIU/ML (ref 0.36–3.74)
UROBILINOGEN UR STRIP-ACNC: <2
VLDLC SERPL CALC-MCNC: 13 MG/DL (ref 0–30)
WBC # BLD AUTO: 5.5 X10(3) UL (ref 4–11)

## 2021-08-09 PROCEDURE — 84443 ASSAY THYROID STIM HORMONE: CPT

## 2021-08-09 PROCEDURE — 36415 COLL VENOUS BLD VENIPUNCTURE: CPT

## 2021-08-09 PROCEDURE — 80061 LIPID PANEL: CPT

## 2021-08-09 PROCEDURE — 80053 COMPREHEN METABOLIC PANEL: CPT

## 2021-08-09 PROCEDURE — 85025 COMPLETE CBC W/AUTO DIFF WBC: CPT

## 2021-08-09 PROCEDURE — 81003 URINALYSIS AUTO W/O SCOPE: CPT | Performed by: INTERNAL MEDICINE

## 2021-08-09 PROCEDURE — 83036 HEMOGLOBIN GLYCOSYLATED A1C: CPT

## 2021-08-09 NOTE — TELEPHONE ENCOUNTER
Spoke to patient and relayed MD message. Pt verbalized understanding and agrees with plan.   Labs mailed to verified address

## 2021-08-09 NOTE — TELEPHONE ENCOUNTER
Please let patient know that I thought her labs came off good. I placed copy in outbox that she can have to review.   Her cholesterol was slightly elevated but I am not thinking she needs to be on a cholesterol medicine but we can mail her the results and

## 2021-09-15 ENCOUNTER — LAB REQUISITION (OUTPATIENT)
Dept: LAB | Facility: HOSPITAL | Age: 61
End: 2021-09-15
Payer: COMMERCIAL

## 2021-09-15 DIAGNOSIS — Z11.51 ENCOUNTER FOR SCREENING FOR HUMAN PAPILLOMAVIRUS (HPV): ICD-10-CM

## 2021-09-15 DIAGNOSIS — Z01.419 ENCOUNTER FOR GYNECOLOGICAL EXAMINATION (GENERAL) (ROUTINE) WITHOUT ABNORMAL FINDINGS: ICD-10-CM

## 2021-09-15 PROCEDURE — 88175 CYTOPATH C/V AUTO FLUID REDO: CPT | Performed by: OBSTETRICS & GYNECOLOGY

## 2021-09-15 PROCEDURE — 87624 HPV HI-RISK TYP POOLED RSLT: CPT | Performed by: OBSTETRICS & GYNECOLOGY

## 2021-09-16 LAB — HPV I/H RISK 1 DNA SPEC QL NAA+PROBE: NEGATIVE

## 2021-09-20 LAB — LMP: 2012

## 2021-10-24 ENCOUNTER — HOSPITAL ENCOUNTER (OUTPATIENT)
Dept: MAMMOGRAPHY | Facility: HOSPITAL | Age: 61
Discharge: HOME | End: 2021-10-24
Attending: OBSTETRICS & GYNECOLOGY
Payer: COMMERCIAL

## 2021-10-24 DIAGNOSIS — Z12.31 ENCOUNTER FOR SCREENING MAMMOGRAM FOR MALIGNANT NEOPLASM OF BREAST: ICD-10-CM

## 2021-10-24 PROCEDURE — 77063 BREAST TOMOSYNTHESIS BI: CPT | Performed by: OBSTETRICS & GYNECOLOGY

## 2021-10-24 PROCEDURE — 77067 SCR MAMMO BI INCL CAD: CPT | Performed by: OBSTETRICS & GYNECOLOGY

## 2021-10-30 RX ORDER — ENALAPRIL MALEATE 2.5 MG/1
TABLET ORAL
Qty: 90 TABLET | Refills: 3 | Status: SHIPPED | OUTPATIENT
Start: 2021-10-30

## 2021-10-30 RX ORDER — LEVOTHYROXINE SODIUM 75 MCG
TABLET ORAL
Qty: 90 TABLET | Refills: 3 | Status: SHIPPED | OUTPATIENT
Start: 2021-10-30

## 2021-11-21 NOTE — TELEPHONE ENCOUNTER
Called back patient's spouse [facility] per hipaa--  He reports patient's symptoms started about Friday.  She has lost her voice, she is coughing but not able to bring up any mucus, she has a lot of nasal congestion and has been blowing her nose a lot, so much No

## 2021-12-06 ENCOUNTER — TELEPHONE (OUTPATIENT)
Dept: INTERNAL MEDICINE CLINIC | Facility: CLINIC | Age: 61
End: 2021-12-06

## 2021-12-06 RX ORDER — AMOXICILLIN 875 MG/1
875 TABLET, COATED ORAL 2 TIMES DAILY
Qty: 14 TABLET | Refills: 0 | Status: SHIPPED | OUTPATIENT
Start: 2021-12-06 | End: 2021-12-13

## 2021-12-07 ENCOUNTER — TELEPHONE (OUTPATIENT)
Dept: INTERNAL MEDICINE CLINIC | Facility: CLINIC | Age: 61
End: 2021-12-07

## 2021-12-07 NOTE — TELEPHONE ENCOUNTER
Yesterday I saw Nohemi Hercules when she was here with her significant other. She has had a lingering head congestion and sinus congestion. Colored nasal discharge. I did take her temperature and it was normal.  HEENT exam was unremarkable.   Pharynx without eryth

## 2022-03-28 NOTE — TELEPHONE ENCOUNTER
Pt was wondering if massage therapy would be a good option for her for in addition to physical therapy. Oculoplastic Surgeon Procedure Text (A): After obtaining clear surgical margins the patient was sent to oculoplastics for surgical repair.  The patient understands they will receive post-surgical care and follow-up from the referring physician's office.

## 2022-06-03 RX ORDER — MELOXICAM 15 MG/1
TABLET ORAL
Qty: 30 TABLET | Refills: 0 | Status: SHIPPED | OUTPATIENT
Start: 2022-06-03 | End: 2023-02-15

## 2022-06-07 NOTE — TELEPHONE ENCOUNTER
Spoke to patient she will call the office back to schedule her appointment after she talks to her daughter

## 2022-07-11 ENCOUNTER — TELEPHONE (OUTPATIENT)
Dept: INTERNAL MEDICINE CLINIC | Facility: CLINIC | Age: 62
End: 2022-07-11

## 2022-07-11 ENCOUNTER — OFFICE VISIT (OUTPATIENT)
Dept: INTERNAL MEDICINE CLINIC | Facility: CLINIC | Age: 62
End: 2022-07-11
Payer: COMMERCIAL

## 2022-07-11 VITALS
WEIGHT: 150 LBS | OXYGEN SATURATION: 97 % | HEART RATE: 94 BPM | HEIGHT: 66.5 IN | TEMPERATURE: 99 F | DIASTOLIC BLOOD PRESSURE: 82 MMHG | SYSTOLIC BLOOD PRESSURE: 138 MMHG | BODY MASS INDEX: 23.82 KG/M2

## 2022-07-11 DIAGNOSIS — Z00.00 ROUTINE HEALTH MAINTENANCE: ICD-10-CM

## 2022-07-11 DIAGNOSIS — I35.1 NONRHEUMATIC AORTIC VALVE INSUFFICIENCY: ICD-10-CM

## 2022-07-11 DIAGNOSIS — I10 ESSENTIAL HYPERTENSION: ICD-10-CM

## 2022-07-11 DIAGNOSIS — E03.9 ACQUIRED HYPOTHYROIDISM: ICD-10-CM

## 2022-07-11 DIAGNOSIS — L60.8 TOENAIL DEFORMITY: ICD-10-CM

## 2022-07-11 DIAGNOSIS — Z01.419 ENCOUNTER FOR ANNUAL ROUTINE GYNECOLOGICAL EXAMINATION: ICD-10-CM

## 2022-07-11 DIAGNOSIS — Z00.00 ANNUAL PHYSICAL EXAM: Primary | ICD-10-CM

## 2022-07-11 DIAGNOSIS — R73.9 ELEVATED BLOOD SUGAR: ICD-10-CM

## 2022-07-11 PROCEDURE — 3075F SYST BP GE 130 - 139MM HG: CPT | Performed by: INTERNAL MEDICINE

## 2022-07-11 PROCEDURE — 99396 PREV VISIT EST AGE 40-64: CPT | Performed by: INTERNAL MEDICINE

## 2022-07-11 PROCEDURE — 3008F BODY MASS INDEX DOCD: CPT | Performed by: INTERNAL MEDICINE

## 2022-07-11 PROCEDURE — 3079F DIAST BP 80-89 MM HG: CPT | Performed by: INTERNAL MEDICINE

## 2022-07-11 NOTE — TELEPHONE ENCOUNTER
Please call patient. Please ask her about her daughter Eugenia Monge. This is in regards to the ciclopirox topical ointment for her toenail fungus. I was reviewing the instructions on the product and it is indicated to be taken and put on toenails once a day and then once a week to remove with alcohol. However the reference that I reviewed indicated to use it up to 48 weeks.     Please ask mother to just review how him he is taking and then I can figure out a refill along with the meloxicam that we discussed during office visit Monday

## 2022-07-12 NOTE — TELEPHONE ENCOUNTER
Spoke to patient and relayed MD message regarding med or her daughter Meeta. Pt verbalized understanding and agrees with plan. Requesting Meloxicam refill for her daughter, different TE started.

## 2022-07-14 LAB — AMB EXT COVID-19 RESULT: DETECTED

## 2022-07-15 ENCOUNTER — TELEPHONE (OUTPATIENT)
Dept: INTERNAL MEDICINE CLINIC | Facility: CLINIC | Age: 62
End: 2022-07-15

## 2022-07-15 RX ORDER — METRONIDAZOLE 10 MG/G
1 GEL TOPICAL DAILY
Qty: 60 G | Refills: 3 | Status: SHIPPED | OUTPATIENT
Start: 2022-07-15 | End: 2023-08-04

## 2022-07-15 NOTE — TELEPHONE ENCOUNTER
Discussed with Mirta. Reviewed symptoms. She does have a little bit of a cough. Sinus symptoms. No shortness of breath. She feels like she has a \"cold\". Postnasal drip. She is triply vaccinated. She does have aortic insufficiency. We discussed pros and cons of giving Paxlovid. I think because of her cardiac condition I would favor Paxlovid. I did call this in for her. I did discuss isolation protocol. She knows she can call if she feels worse in any way. I asked her to check in Monday with how she feels.

## 2022-07-15 NOTE — TELEPHONE ENCOUNTER
Spoke to [employer]  She started feeling sick about two days ago  Rapid home covid test was positive yesterday    She has a productive cough, stuffy sinuses, post nasal drip, slight sore throat, feels tired. Denies headache or dizziness. Denies nausea/vomitting/diarrhea. Denies changes to smell or taste. Denies shortness of breath. Denies chest pain or tightness. Denies wheezing. She reports having a total of 3 covid vaccines     She asks about paxlovid and if Dr Kostas Jenkins would recommend that  [facility] [place] [facility]    To Dr Sunny Murray isolation guidelines: Day 0 is first day of symptoms. Stay home for at least 5 days. If symptoms have resolved or are resolving after 5 days and also fever free for 24 hours with no fever reducing medication, can leave house but continue to wear a well fitting mask for 5 additional days. Do not go to places where you are unable to wear a mask until 10 days after your first day of symptoms. Do not travel until a full 10 days after your symptoms started. Reviewed importance of staying in a specific room and using a separate bathroom if living with others. Wear a well fitting mask if you have to be around others in your home. Reviewed importance of monitoring symptoms and to call the office with any new or worsening symptoms. Report to ER for any chest pain, shortness of breath, or high fever  Reviewed importance of resting and pushing fluids. Patient verbalized understanding of instructions.

## 2022-07-15 NOTE — TELEPHONE ENCOUNTER
Patient is calling she tested positive for covid on 7/14.   Patient is experiencing cough, sinus congestion, runny nose and tired    Patient is asking about Luciana Roa Phone [phone]      Phone [phone]

## 2022-07-18 NOTE — TELEPHONE ENCOUNTER
Called and got update from patient. Patient started taking paxlovid on Friday. She is feeling much better. Patient stated her partner now tested positive. This is documented in a separate encounter.     FYI to Dr. Rosendo Aguilar--

## 2022-07-18 NOTE — TELEPHONE ENCOUNTER
Noted.  When I did call her significant other he did say that she stopped the Paxlovid because of ill side effects.

## 2022-08-16 ENCOUNTER — LAB ENCOUNTER (OUTPATIENT)
Dept: LAB | Facility: HOSPITAL | Age: 62
End: 2022-08-16
Attending: INTERNAL MEDICINE
Payer: COMMERCIAL

## 2022-08-16 ENCOUNTER — TELEPHONE (OUTPATIENT)
Dept: INTERNAL MEDICINE CLINIC | Facility: CLINIC | Age: 62
End: 2022-08-16

## 2022-08-16 DIAGNOSIS — R73.9 ELEVATED BLOOD SUGAR: ICD-10-CM

## 2022-08-16 DIAGNOSIS — E03.9 ACQUIRED HYPOTHYROIDISM: ICD-10-CM

## 2022-08-16 DIAGNOSIS — I10 ESSENTIAL HYPERTENSION: ICD-10-CM

## 2022-08-16 DIAGNOSIS — Z00.00 ANNUAL PHYSICAL EXAM: ICD-10-CM

## 2022-08-16 LAB
ALBUMIN SERPL-MCNC: 3.7 G/DL (ref 3.4–5)
ALBUMIN/GLOB SERPL: 1.1 {RATIO} (ref 1–2)
ALP LIVER SERPL-CCNC: 86 U/L
ALT SERPL-CCNC: 22 U/L
ANION GAP SERPL CALC-SCNC: 4 MMOL/L (ref 0–18)
AST SERPL-CCNC: 19 U/L (ref 15–37)
BASOPHILS # BLD AUTO: 0.01 X10(3) UL (ref 0–0.2)
BASOPHILS NFR BLD AUTO: 0.2 %
BILIRUB SERPL-MCNC: 0.6 MG/DL (ref 0.1–2)
BILIRUB UR QL: NEGATIVE
BUN BLD-MCNC: 16 MG/DL (ref 7–18)
BUN/CREAT SERPL: 19.3 (ref 10–20)
CALCIUM BLD-MCNC: 9.6 MG/DL (ref 8.5–10.1)
CHLORIDE SERPL-SCNC: 107 MMOL/L (ref 98–112)
CHOLEST SERPL-MCNC: 190 MG/DL (ref ?–200)
CLARITY UR: CLEAR
CO2 SERPL-SCNC: 29 MMOL/L (ref 21–32)
COLOR UR: YELLOW
CREAT BLD-MCNC: 0.83 MG/DL
DEPRECATED RDW RBC AUTO: 42.9 FL (ref 35.1–46.3)
EOSINOPHIL # BLD AUTO: 0.06 X10(3) UL (ref 0–0.7)
EOSINOPHIL NFR BLD AUTO: 1.2 %
ERYTHROCYTE [DISTWIDTH] IN BLOOD BY AUTOMATED COUNT: 12.4 % (ref 11–15)
EST. AVERAGE GLUCOSE BLD GHB EST-MCNC: 105 MG/DL (ref 68–126)
FASTING PATIENT LIPID ANSWER: YES
FASTING STATUS PATIENT QL REPORTED: YES
GFR SERPLBLD BASED ON 1.73 SQ M-ARVRAT: 79 ML/MIN/1.73M2 (ref 60–?)
GLOBULIN PLAS-MCNC: 3.5 G/DL (ref 2.8–4.4)
GLUCOSE BLD-MCNC: 95 MG/DL (ref 70–99)
GLUCOSE UR-MCNC: NEGATIVE MG/DL
HBA1C MFR BLD: 5.3 % (ref ?–5.7)
HCT VFR BLD AUTO: 42.1 %
HDLC SERPL-MCNC: 60 MG/DL (ref 40–59)
HGB BLD-MCNC: 13.8 G/DL
HGB UR QL STRIP.AUTO: NEGATIVE
IMM GRANULOCYTES # BLD AUTO: 0.01 X10(3) UL (ref 0–1)
IMM GRANULOCYTES NFR BLD: 0.2 %
KETONES UR-MCNC: NEGATIVE MG/DL
LDLC SERPL CALC-MCNC: 113 MG/DL (ref ?–100)
LEUKOCYTE ESTERASE UR QL STRIP.AUTO: NEGATIVE
LYMPHOCYTES # BLD AUTO: 1.57 X10(3) UL (ref 1–4)
LYMPHOCYTES NFR BLD AUTO: 31.1 %
MCH RBC QN AUTO: 31.1 PG (ref 26–34)
MCHC RBC AUTO-ENTMCNC: 32.8 G/DL (ref 31–37)
MCV RBC AUTO: 94.8 FL
MONOCYTES # BLD AUTO: 0.42 X10(3) UL (ref 0.1–1)
MONOCYTES NFR BLD AUTO: 8.3 %
NEUTROPHILS # BLD AUTO: 2.98 X10 (3) UL (ref 1.5–7.7)
NEUTROPHILS # BLD AUTO: 2.98 X10(3) UL (ref 1.5–7.7)
NEUTROPHILS NFR BLD AUTO: 59 %
NITRITE UR QL STRIP.AUTO: NEGATIVE
NONHDLC SERPL-MCNC: 130 MG/DL (ref ?–130)
OSMOLALITY SERPL CALC.SUM OF ELEC: 291 MOSM/KG (ref 275–295)
PH UR: 7 [PH] (ref 5–8)
PLATELET # BLD AUTO: 180 10(3)UL (ref 150–450)
POTASSIUM SERPL-SCNC: 3.7 MMOL/L (ref 3.5–5.1)
PROT SERPL-MCNC: 7.2 G/DL (ref 6.4–8.2)
PROT UR-MCNC: NEGATIVE MG/DL
RBC # BLD AUTO: 4.44 X10(6)UL
SODIUM SERPL-SCNC: 140 MMOL/L (ref 136–145)
SP GR UR STRIP: 1.02 (ref 1–1.03)
TRIGL SERPL-MCNC: 95 MG/DL (ref 30–149)
TSI SER-ACNC: 1.87 MIU/ML (ref 0.36–3.74)
UROBILINOGEN UR STRIP-ACNC: 0.2
VLDLC SERPL CALC-MCNC: 16 MG/DL (ref 0–30)
WBC # BLD AUTO: 5.1 X10(3) UL (ref 4–11)

## 2022-08-16 PROCEDURE — 36415 COLL VENOUS BLD VENIPUNCTURE: CPT

## 2022-08-16 PROCEDURE — 80053 COMPREHEN METABOLIC PANEL: CPT

## 2022-08-16 PROCEDURE — 83036 HEMOGLOBIN GLYCOSYLATED A1C: CPT

## 2022-08-16 PROCEDURE — 81003 URINALYSIS AUTO W/O SCOPE: CPT | Performed by: INTERNAL MEDICINE

## 2022-08-16 PROCEDURE — 84443 ASSAY THYROID STIM HORMONE: CPT

## 2022-08-16 PROCEDURE — 80061 LIPID PANEL: CPT

## 2022-08-16 PROCEDURE — 85025 COMPLETE CBC W/AUTO DIFF WBC: CPT

## 2022-08-16 NOTE — TELEPHONE ENCOUNTER
Please let patient know that I thought her labs came out good. She can have a copy if she wishes. Her cholesterol was 190. She has a very good \"good\" cholesterol with her HDL being 60. Her LDL is a little bit elevated but for now she does not need any cholesterol medication. The rest of her labs I thought were good.     ( There is a message in regards to her daughter Meeta, and her chart )

## 2022-08-18 NOTE — TELEPHONE ENCOUNTER
Spoke to patient and relayed MD message. Pt verbalized understanding and agrees with plan. Requesting labs mailed to her, address verified.

## 2022-09-20 ENCOUNTER — OFFICE VISIT (OUTPATIENT)
Dept: PODIATRY CLINIC | Facility: CLINIC | Age: 62
End: 2022-09-20

## 2022-09-20 DIAGNOSIS — L60.0 INGROWN TOENAIL OF BOTH FEET: Primary | ICD-10-CM

## 2022-09-20 DIAGNOSIS — B35.1 ONYCHOMYCOSIS: ICD-10-CM

## 2022-09-20 PROCEDURE — 99203 OFFICE O/P NEW LOW 30 MIN: CPT | Performed by: PODIATRIST

## 2022-10-06 RX ORDER — LEVOTHYROXINE SODIUM 75 MCG
TABLET ORAL
Qty: 90 TABLET | Refills: 3 | Status: SHIPPED | OUTPATIENT
Start: 2022-10-06

## 2022-10-06 NOTE — TELEPHONE ENCOUNTER
LOV 7/11/22    \"2. Essential hypertension  Blood pressure slightly elevated. I have asked her to get a few blood pressures at home and call with results. S/w pt. Pt reports these pressures:  131/80  146/80  137/86  127/83  134/81    She checks her BP ~1x week. States she takes her enalapril consistently. Dr. Simon Snyder please advise if you want her to cont same dose?         Refill request is for a maintenance medication and has met the criteria specified in the Ambulatory Medication Refill Standing Order for eligibility, visits, laboratory, alerts and was sent to the requested pharmacy - [facility]

## 2022-10-07 RX ORDER — ENALAPRIL MALEATE 5 MG/1
5 TABLET ORAL DAILY
Qty: 90 TABLET | Refills: 3 | Status: SHIPPED | OUTPATIENT
Start: 2022-10-07

## 2022-11-15 ENCOUNTER — TELEPHONE (OUTPATIENT)
Dept: INTERNAL MEDICINE CLINIC | Facility: CLINIC | Age: 62
End: 2022-11-15

## 2022-11-15 DIAGNOSIS — M65.332 TRIGGER FINGER, LEFT MIDDLE FINGER: Primary | ICD-10-CM

## 2022-11-15 NOTE — TELEPHONE ENCOUNTER
LMTCB.  please give provider information below she is requesting. Provider Address Phone   Bertin Cox MD [address] [facility] [address] [phone]

## 2022-11-15 NOTE — TELEPHONE ENCOUNTER
Patient is calling to request a referral for a hand specialist.  Patient is experiencing Trigger Finger middle finger on left hand, she is in a lot of pain. Patient doesn't remember who she saw in the past for this issue.     Please call patient with information [phone]

## 2022-11-16 NOTE — TELEPHONE ENCOUNTER
Pt called, information provided    Dr Noel Damon had ordered an xray when patient had this issue with her other hand    Please call patient to advise if xray recommended    Tasked to nursing

## 2022-11-17 NOTE — TELEPHONE ENCOUNTER
Please call patient. I can order x-ray. Please confirm which hand it is. Plus if it is just in a finger I can order an x-ray of the finger or I could do an x-ray of the whole hand.   She can let us know what she thinks

## 2022-11-23 NOTE — TELEPHONE ENCOUNTER
Patient is returning nurse call (not available)  She said it is her left hand, more towards the middle of the left hand. In the morning she can barely move her finger, she puts it in warm water and flexes it until it opens up. Patient feels the x-ray should be the entire hand.

## 2022-12-05 ENCOUNTER — OFFICE VISIT (OUTPATIENT)
Dept: SURGERY | Facility: CLINIC | Age: 62
End: 2022-12-05
Payer: COMMERCIAL

## 2022-12-05 ENCOUNTER — HOSPITAL ENCOUNTER (OUTPATIENT)
Dept: GENERAL RADIOLOGY | Facility: HOSPITAL | Age: 62
Discharge: HOME | End: 2022-12-05
Attending: INTERNAL MEDICINE
Payer: COMMERCIAL

## 2022-12-05 DIAGNOSIS — M65.332 TRIGGER MIDDLE FINGER OF LEFT HAND: ICD-10-CM

## 2022-12-05 DIAGNOSIS — M65.332 TRIGGER FINGER, LEFT MIDDLE FINGER: ICD-10-CM

## 2022-12-05 DIAGNOSIS — M72.0 DUPUYTRENS CONTRACTURE: Primary | ICD-10-CM

## 2022-12-05 PROCEDURE — 73130 X-RAY EXAM OF HAND: CPT | Performed by: INTERNAL MEDICINE

## 2022-12-05 PROCEDURE — 99243 OFF/OP CNSLTJ NEW/EST LOW 30: CPT | Performed by: PLASTIC SURGERY

## 2022-12-26 ENCOUNTER — HOSPITAL ENCOUNTER (OUTPATIENT)
Dept: CT IMAGING | Age: 62
Discharge: HOME | End: 2022-12-26
Attending: INTERNAL MEDICINE

## 2022-12-26 DIAGNOSIS — I10 ESSENTIAL HYPERTENSION: ICD-10-CM

## 2022-12-26 DIAGNOSIS — I35.1 AORTIC INSUFFICIENCY: ICD-10-CM

## 2022-12-31 ENCOUNTER — TELEPHONE (OUTPATIENT)
Dept: INTERNAL MEDICINE CLINIC | Facility: CLINIC | Age: 62
End: 2022-12-31

## 2022-12-31 NOTE — TELEPHONE ENCOUNTER
Please let patient know that her coronary artery calcium score came out 0. This is an excellent result.

## 2023-06-22 ENCOUNTER — ORDER TRANSCRIPTION (OUTPATIENT)
Dept: ADMINISTRATIVE | Facility: HOSPITAL | Age: 63
End: 2023-06-22

## 2023-06-22 DIAGNOSIS — I10 ESSENTIAL HYPERTENSION: ICD-10-CM

## 2023-06-22 DIAGNOSIS — I35.1 AORTIC INSUFFICIENCY: Primary | ICD-10-CM

## 2023-07-09 ENCOUNTER — HOSPITAL ENCOUNTER (OUTPATIENT)
Dept: MAMMOGRAPHY | Facility: HOSPITAL | Age: 63
End: 2023-07-09
Attending: OBSTETRICS & GYNECOLOGY
Payer: COMMERCIAL

## 2023-07-09 ENCOUNTER — HOSPITAL ENCOUNTER (OUTPATIENT)
Dept: MAMMOGRAPHY | Facility: HOSPITAL | Age: 63
Discharge: HOME | End: 2023-07-09
Attending: OBSTETRICS & GYNECOLOGY
Payer: COMMERCIAL

## 2023-07-09 DIAGNOSIS — Z12.31 ENCOUNTER FOR SCREENING MAMMOGRAM FOR MALIGNANT NEOPLASM OF BREAST: ICD-10-CM

## 2023-07-09 PROCEDURE — 77067 SCR MAMMO BI INCL CAD: CPT | Performed by: OBSTETRICS & GYNECOLOGY

## 2023-07-09 PROCEDURE — 77063 BREAST TOMOSYNTHESIS BI: CPT | Performed by: OBSTETRICS & GYNECOLOGY

## 2023-07-20 ENCOUNTER — OFFICE VISIT (OUTPATIENT)
Dept: INTERNAL MEDICINE CLINIC | Facility: CLINIC | Age: 63
End: 2023-07-20

## 2023-07-20 VITALS
BODY MASS INDEX: 24.46 KG/M2 | SYSTOLIC BLOOD PRESSURE: 142 MMHG | WEIGHT: 154 LBS | RESPIRATION RATE: 20 BRPM | TEMPERATURE: 98 F | OXYGEN SATURATION: 98 % | HEIGHT: 66.5 IN | HEART RATE: 73 BPM | DIASTOLIC BLOOD PRESSURE: 80 MMHG

## 2023-07-20 DIAGNOSIS — Z00.00 ANNUAL PHYSICAL EXAM: Primary | ICD-10-CM

## 2023-07-20 DIAGNOSIS — G89.29 CHRONIC HAND PAIN, LEFT: ICD-10-CM

## 2023-07-20 DIAGNOSIS — M79.642 CHRONIC HAND PAIN, LEFT: ICD-10-CM

## 2023-07-20 DIAGNOSIS — I35.1 NONRHEUMATIC AORTIC VALVE INSUFFICIENCY: ICD-10-CM

## 2023-07-20 DIAGNOSIS — I10 ESSENTIAL HYPERTENSION: ICD-10-CM

## 2023-07-20 DIAGNOSIS — Z01.419 ENCOUNTER FOR GYNECOLOGICAL EXAMINATION WITHOUT ABNORMAL FINDING: ICD-10-CM

## 2023-07-20 DIAGNOSIS — Z00.00 ROUTINE HEALTH MAINTENANCE: ICD-10-CM

## 2023-07-20 DIAGNOSIS — R73.9 ELEVATED BLOOD SUGAR: ICD-10-CM

## 2023-07-20 DIAGNOSIS — R42 VERTIGO: ICD-10-CM

## 2023-07-20 DIAGNOSIS — M72.0 DUPUYTREN'S CONTRACTURE: ICD-10-CM

## 2023-07-20 DIAGNOSIS — E03.9 ACQUIRED HYPOTHYROIDISM: ICD-10-CM

## 2023-07-20 PROCEDURE — 3008F BODY MASS INDEX DOCD: CPT | Performed by: INTERNAL MEDICINE

## 2023-07-20 PROCEDURE — 3077F SYST BP >= 140 MM HG: CPT | Performed by: INTERNAL MEDICINE

## 2023-07-20 PROCEDURE — 3079F DIAST BP 80-89 MM HG: CPT | Performed by: INTERNAL MEDICINE

## 2023-07-20 PROCEDURE — 99396 PREV VISIT EST AGE 40-64: CPT | Performed by: INTERNAL MEDICINE

## 2023-07-20 RX ORDER — MELATONIN
1000 DAILY
COMMUNITY

## 2023-07-20 RX ORDER — UBIDECARENONE 60 MG
CAPSULE ORAL
COMMUNITY

## 2023-08-04 RX ORDER — METRONIDAZOLE 10 MG/G
1 GEL TOPICAL DAILY
Qty: 60 G | Refills: 3 | Status: SHIPPED | OUTPATIENT
Start: 2023-08-04

## 2023-08-04 NOTE — TELEPHONE ENCOUNTER
Refill request is for a maintenance medication and has met the criteria specified in the Ambulatory Medication Refill Standing Order for eligibility, visits, laboratory, alerts and was sent to the requested pharmacy.     Requested Prescriptions     Signed Prescriptions Disp Refills    METRONIDAZOLE 1 % External Gel 60 g 3     Sig: APPLY TOPICALLY TO THE AFFECTED AREA DAILY     Authorizing Provider: Brittany Frank     Ordering User: Christa Talamantes

## 2023-08-07 RX ORDER — MELOXICAM 15 MG/1
TABLET ORAL
Qty: 30 TABLET | Refills: 2 | Status: SHIPPED | OUTPATIENT
Start: 2023-08-07

## 2023-08-07 NOTE — TELEPHONE ENCOUNTER
Refill request is for a maintenance medication and has met the criteria specified in the Ambulatory Medication Refill Standing Order for eligibility, visits, laboratory, alerts and was sent to the requested pharmacy.     Requested Prescriptions     Signed Prescriptions Disp Refills    MELOXICAM 15 MG Oral Tab 30 tablet 2     Sig: TAKE 1 TABLET BY MOUTH DAILY AS NEEDED FOR BACK PAIN     Authorizing Provider: Jhony Hernandes     Ordering User: Cole Garner

## 2023-08-08 ENCOUNTER — OFFICE VISIT (OUTPATIENT)
Dept: PODIATRY CLINIC | Facility: CLINIC | Age: 63
End: 2023-08-08

## 2023-08-08 ENCOUNTER — TELEPHONE (OUTPATIENT)
Dept: INTERNAL MEDICINE CLINIC | Facility: CLINIC | Age: 63
End: 2023-08-08

## 2023-08-08 ENCOUNTER — NURSE ONLY (OUTPATIENT)
Dept: INTERNAL MEDICINE CLINIC | Facility: CLINIC | Age: 63
End: 2023-08-08

## 2023-08-08 ENCOUNTER — LAB ENCOUNTER (OUTPATIENT)
Dept: LAB | Facility: HOSPITAL | Age: 63
End: 2023-08-08
Attending: INTERNAL MEDICINE
Payer: COMMERCIAL

## 2023-08-08 VITALS — SYSTOLIC BLOOD PRESSURE: 126 MMHG | DIASTOLIC BLOOD PRESSURE: 82 MMHG

## 2023-08-08 DIAGNOSIS — L60.3 NAIL DYSTROPHY: Primary | ICD-10-CM

## 2023-08-08 DIAGNOSIS — Z00.00 ANNUAL PHYSICAL EXAM: ICD-10-CM

## 2023-08-08 DIAGNOSIS — E03.9 ACQUIRED HYPOTHYROIDISM: ICD-10-CM

## 2023-08-08 DIAGNOSIS — I10 ESSENTIAL HYPERTENSION: Primary | ICD-10-CM

## 2023-08-08 DIAGNOSIS — R73.9 ELEVATED BLOOD SUGAR: ICD-10-CM

## 2023-08-08 DIAGNOSIS — I10 ESSENTIAL HYPERTENSION: ICD-10-CM

## 2023-08-08 LAB
ALBUMIN SERPL-MCNC: 3.9 G/DL (ref 3.4–5)
ALBUMIN/GLOB SERPL: 1.1 {RATIO} (ref 1–2)
ALP LIVER SERPL-CCNC: 87 U/L
ALT SERPL-CCNC: 18 U/L
ANION GAP SERPL CALC-SCNC: 5 MMOL/L (ref 0–18)
AST SERPL-CCNC: 18 U/L (ref 15–37)
BASOPHILS # BLD AUTO: 0.01 X10(3) UL (ref 0–0.2)
BASOPHILS NFR BLD AUTO: 0.2 %
BILIRUB SERPL-MCNC: 0.6 MG/DL (ref 0.1–2)
BILIRUB UR QL: NEGATIVE
BUN BLD-MCNC: 11 MG/DL (ref 7–18)
BUN/CREAT SERPL: 13.1 (ref 10–20)
CALCIUM BLD-MCNC: 9.6 MG/DL (ref 8.5–10.1)
CHLORIDE SERPL-SCNC: 108 MMOL/L (ref 98–112)
CHOLEST SERPL-MCNC: 176 MG/DL (ref ?–200)
CLARITY UR: CLEAR
CO2 SERPL-SCNC: 30 MMOL/L (ref 21–32)
CREAT BLD-MCNC: 0.84 MG/DL
DEPRECATED RDW RBC AUTO: 41.9 FL (ref 35.1–46.3)
EGFRCR SERPLBLD CKD-EPI 2021: 78 ML/MIN/1.73M2 (ref 60–?)
EOSINOPHIL # BLD AUTO: 0.05 X10(3) UL (ref 0–0.7)
EOSINOPHIL NFR BLD AUTO: 0.9 %
ERYTHROCYTE [DISTWIDTH] IN BLOOD BY AUTOMATED COUNT: 12.6 % (ref 11–15)
EST. AVERAGE GLUCOSE BLD GHB EST-MCNC: 105 MG/DL (ref 68–126)
FASTING PATIENT LIPID ANSWER: NO
FASTING STATUS PATIENT QL REPORTED: NO
GLOBULIN PLAS-MCNC: 3.5 G/DL (ref 2.8–4.4)
GLUCOSE BLD-MCNC: 102 MG/DL (ref 70–99)
GLUCOSE UR-MCNC: NORMAL MG/DL
HBA1C MFR BLD: 5.3 % (ref ?–5.7)
HCT VFR BLD AUTO: 42 %
HDLC SERPL-MCNC: 59 MG/DL (ref 40–59)
HGB BLD-MCNC: 14.1 G/DL
IMM GRANULOCYTES # BLD AUTO: 0.01 X10(3) UL (ref 0–1)
IMM GRANULOCYTES NFR BLD: 0.2 %
KETONES UR-MCNC: NEGATIVE MG/DL
LDLC SERPL CALC-MCNC: 93 MG/DL (ref ?–100)
LEUKOCYTE ESTERASE UR QL STRIP.AUTO: NEGATIVE
LYMPHOCYTES # BLD AUTO: 1.79 X10(3) UL (ref 1–4)
LYMPHOCYTES NFR BLD AUTO: 30.7 %
MCH RBC QN AUTO: 30.8 PG (ref 26–34)
MCHC RBC AUTO-ENTMCNC: 33.6 G/DL (ref 31–37)
MCV RBC AUTO: 91.7 FL
MONOCYTES # BLD AUTO: 0.39 X10(3) UL (ref 0.1–1)
MONOCYTES NFR BLD AUTO: 6.7 %
NEUTROPHILS # BLD AUTO: 3.58 X10 (3) UL (ref 1.5–7.7)
NEUTROPHILS # BLD AUTO: 3.58 X10(3) UL (ref 1.5–7.7)
NEUTROPHILS NFR BLD AUTO: 61.3 %
NITRITE UR QL STRIP.AUTO: NEGATIVE
NONHDLC SERPL-MCNC: 117 MG/DL (ref ?–130)
OSMOLALITY SERPL CALC.SUM OF ELEC: 296 MOSM/KG (ref 275–295)
PH UR: 5.5 [PH] (ref 5–8)
PLATELET # BLD AUTO: 212 10(3)UL (ref 150–450)
POTASSIUM SERPL-SCNC: 4.6 MMOL/L (ref 3.5–5.1)
PROT SERPL-MCNC: 7.4 G/DL (ref 6.4–8.2)
PROT UR-MCNC: NEGATIVE MG/DL
RBC # BLD AUTO: 4.58 X10(6)UL
SODIUM SERPL-SCNC: 143 MMOL/L (ref 136–145)
SP GR UR STRIP: 1.01 (ref 1–1.03)
TRIGL SERPL-MCNC: 137 MG/DL (ref 30–149)
TSI SER-ACNC: 1.93 MIU/ML (ref 0.36–3.74)
UROBILINOGEN UR STRIP-ACNC: NORMAL
VIT D+METAB SERPL-MCNC: 45.4 NG/ML (ref 30–100)
VLDLC SERPL CALC-MCNC: 22 MG/DL (ref 0–30)
WBC # BLD AUTO: 5.8 X10(3) UL (ref 4–11)

## 2023-08-08 PROCEDURE — 80053 COMPREHEN METABOLIC PANEL: CPT

## 2023-08-08 PROCEDURE — 85025 COMPLETE CBC W/AUTO DIFF WBC: CPT

## 2023-08-08 PROCEDURE — 83036 HEMOGLOBIN GLYCOSYLATED A1C: CPT

## 2023-08-08 PROCEDURE — 81001 URINALYSIS AUTO W/SCOPE: CPT | Performed by: INTERNAL MEDICINE

## 2023-08-08 PROCEDURE — 36415 COLL VENOUS BLD VENIPUNCTURE: CPT

## 2023-08-08 PROCEDURE — 84443 ASSAY THYROID STIM HORMONE: CPT

## 2023-08-08 PROCEDURE — 82306 VITAMIN D 25 HYDROXY: CPT

## 2023-08-08 PROCEDURE — 80061 LIPID PANEL: CPT

## 2023-08-08 PROCEDURE — 99213 OFFICE O/P EST LOW 20 MIN: CPT | Performed by: STUDENT IN AN ORGANIZED HEALTH CARE EDUCATION/TRAINING PROGRAM

## 2023-08-08 NOTE — PROGRESS NOTES
[facility], [facility]  Progress Note      Riana Estes is a 57year old female. Patient presents with:  Ingrown Toenail: Consult - Ingrown toenail Bilateral Great toe. Patient rates her pain 2/10 at this time. Patient states that when she saw Dr. Jose Puckett, He wanted to take a sample to test for fungus. HPI:     Patient is a 61-year-old female who presents to clinic today for bilateral great toenail evaluation. Patient admits to injury to both digits few years ago. She also admits to incurvated nails to lateral nail folds of both great toes. Allergies: Cheese    Current Outpatient Medications   Medication Sig Dispense Refill    MELOXICAM 15 MG Oral Tab TAKE 1 TABLET BY MOUTH DAILY AS NEEDED FOR BACK PAIN 30 tablet 2    METRONIDAZOLE 1 % External Gel APPLY TOPICALLY TO THE AFFECTED AREA DAILY 60 g 3    Coenzyme Q10 (CO Q 10) 60 MG Oral Cap Take by mouth. cholecalciferol 25 MCG (1000 UT) Oral Tab Take 1 tablet (1,000 Units total) by mouth daily. enalapril 5 MG Oral Tab Take 1 tablet (5 mg total) by mouth daily. 90 tablet 3    SYNTHROID 75 MCG Oral Tab TAKE 1 TABLET DAILY 90 tablet 3    cyclobenzaprine 10 MG Oral Tab Take 1 tablet (10 mg total) by mouth daily as needed. aspirin 81 MG Oral Tab Take 1 tablet (81 mg total) by mouth daily.  0    Multiple Vitamin Oral Tab Take 1 tablet by mouth daily.   0      Past Medical History:   Diagnosis Date    ACL (anterior cruciate ligament) tear     Aortic insufficiency     Bursitis of right hip     Hypothyroidism     Lumbar spondylosis       Past Surgical History:   Procedure Laterality Date    OTHER SURGICAL HISTORY  6-5-2014    shoulder surgery    TONSILLECTOMY        Family History   Problem Relation Age of Onset    Other (Other) Father     Heart Disorder Mother     Lipids Mother     Other (Other) Mother       Social History    Socioeconomic History      Marital status:     Tobacco Use      Smoking status: Never      Smokeless tobacco: Never    Vaping Use      Vaping Use: Never used    Substance and Sexual Activity      Alcohol use: No        Alcohol/week: 0.0 standard drinks of alcohol      Drug use: No    Other Topics      Concerns:        Caffeine Concern: Yes          coke        Exercise: Yes          somewhat        Right Handed: Yes        Currently spends a great deal of time in the sun: Yes        History of tanning: No        Hx of Spending [place] of Time in Sun: No        Bad sunburns in the past: Yes        Tanning Salons in the Past: No        Hx of Radiation Treatments: No          REVIEW OF SYSTEMS:     Denies nause, fever, chills  No calf pain  Denies chest pain or SOB      EXAM:   There were no vitals taken for this visit. GENERAL: well developed, well nourished, in no apparent distress  EXTREMITIES:   1. Integument: Normal skin temperature and turgor. Incurvated lateral nail borders of both great toes. Vadim great toenaisl are discolored and thickened. 2. Vascular: Dorsalis pedis two out of four bilateral and posterior tibial pulses two out of   four bilateral, capillary refill normal.   3. Musculoskeletal: All muscle groups are graded 5 out of 5 in the foot and ankle. 4. Neurological: Normal sharp dull sensation; reflexes normal.             ASSESSMENT AND PLAN:   Diagnoses and all orders for this visit:    Nail dystrophy        Plan:     Reviewed treatment options for nail dystrophy / onychomycosis including:   No treatment and monitoring, topical meds, oral meds, nail removal and laser treatment. Advantages and disadvantages of each reviewed. Using oral agents would require regular   blood test monitoring due to risk of hepatotoxicity and other adverse reactions including drug interactions. Topical meds are much safer yet carry very low efficacy. Pt has opted for oral treatment pending PAS stain result  Specimen obtained and sent to pathology  Advised pt to use tea tree oil to vadim greats.         The patient indicates understanding of these issues and agrees to the plan.         Brendan Bowling DPM

## 2023-08-08 NOTE — TELEPHONE ENCOUNTER
Please let Mirta know the following regarding her test results    Please let her know I thought her test results came out good. Specifically her blood count is good. Her chemistries came out good. Blood sugar was just trivially elevated at 102. However hemoglobin A1c is excellent at 5.3. Her urinalysis showed a \"trace\" amount of blood but there is no significant blood under the dipstick so this does not need any follow-up. Her cholesterol was 176 and LDL 93. I am satisfied with that. Her vitamin D level was good. Her thyroid level is good.   All in all I am very satisfied with the results    ( There is another message regarding her daughter Meeta and another message. )

## 2023-08-14 NOTE — TELEPHONE ENCOUNTER
Spoke with patient to relay MD message below; Patient verbalized understanding. Pt did not have any questions or concerns at this time. She wants to reset [employer] as she does not recall the password. Deactivated and launched setup message via text message.

## 2023-10-26 NOTE — TELEPHONE ENCOUNTER
Patient is requesting a 30 day supply on both medications  She is in between Ins so she does not want a 90 day supply

## 2023-10-27 RX ORDER — ENALAPRIL MALEATE 5 MG/1
5 TABLET ORAL DAILY
Qty: 30 TABLET | Refills: 11 | Status: SHIPPED | OUTPATIENT
Start: 2023-10-27

## 2023-10-27 RX ORDER — LEVOTHYROXINE SODIUM 0.07 MG/1
75 TABLET ORAL DAILY
Qty: 30 TABLET | Refills: 11 | Status: SHIPPED | OUTPATIENT
Start: 2023-10-27

## 2023-10-27 NOTE — TELEPHONE ENCOUNTER
Refill request is for a maintenance medication and has met the criteria specified in the Ambulatory Medication Refill Standing Order for eligibility, visits, laboratory, alerts and was sent to the requested pharmacy. Requested Prescriptions     Signed Prescriptions Disp Refills    enalapril 5 MG Oral Tab 30 tablet 11     Sig: Take 1 tablet (5 mg total) by mouth daily. Authorizing Provider: Tessa Cheek     Ordering User: Omer Machado    levothyroxine (SYNTHROID) 75 MCG Oral Tab 30 tablet 11     Sig: Take 1 tablet (75 mcg total) by mouth daily.      Authorizing Provider: Tessa Serna User: Omer Machado

## 2023-11-08 NOTE — TELEPHONE ENCOUNTER
Patient is calling to request a refill on two of her medications:  Levothyroxine   Enalapril     Please send to [email or web address]. Patient provided [email or web address]'s phone number as well ( # [phone]). Patient stated she has used [email or web address] before but had [employer] at that time. Patient now had [employer] (ID # listed below), insurance also updated in patient's chart.      [employer]  ID # [id number]

## 2023-11-10 RX ORDER — LEVOTHYROXINE SODIUM 0.07 MG/1
75 TABLET ORAL DAILY
Qty: 90 TABLET | Refills: 3 | Status: SHIPPED | OUTPATIENT
Start: 2023-11-10

## 2023-11-10 RX ORDER — ENALAPRIL MALEATE 5 MG/1
5 TABLET ORAL DAILY
Qty: 90 TABLET | Refills: 3 | Status: SHIPPED | OUTPATIENT
Start: 2023-11-10

## 2023-11-10 NOTE — TELEPHONE ENCOUNTER
Noted, RXs sent to mail order. Refill request is for a maintenance medication and has met the criteria specified in the Ambulatory Medication Refill Standing Order for eligibility, visits, laboratory, alerts and was sent to the requested pharmacy. Requested Prescriptions     Signed Prescriptions Disp Refills    enalapril 5 MG Oral Tab 90 tablet 3     Sig: Take 1 tablet (5 mg total) by mouth daily. Authorizing Provider: Robert Lai     Ordering User: Peter Baltazar    levothyroxine (SYNTHROID) 75 MCG Oral Tab 90 tablet 3     Sig: Take 1 tablet (75 mcg total) by mouth daily.      Authorizing Provider: Robert Lai     Ordering User: Peter Baltazar

## 2024-04-18 RX ORDER — MELOXICAM 15 MG/1
TABLET ORAL
Qty: 30 TABLET | Refills: 2 | Status: SHIPPED | OUTPATIENT
Start: 2024-04-18

## 2024-04-18 NOTE — TELEPHONE ENCOUNTER
Refill request is for a maintenance medication and has met the criteria specified in the Ambulatory Medication Refill Standing Order for eligibility, visits, laboratory, alerts and was sent to the requested pharmacy.    Requested Prescriptions     Signed Prescriptions Disp Refills    Meloxicam 15 MG Oral Tab 30 tablet 2     Sig: TAKE 1 TABLET BY MOUTH DAILY AS NEEDED FOR BACK PAIN     Authorizing Provider: BRIAN CAMPA     Ordering User: ED MONTANA

## 2024-07-08 ENCOUNTER — TELEPHONE (OUTPATIENT)
Dept: INTERNAL MEDICINE CLINIC | Facility: CLINIC | Age: 64
End: 2024-07-08

## 2024-07-08 DIAGNOSIS — E03.9 HYPOTHYROIDISM, UNSPECIFIED TYPE: ICD-10-CM

## 2024-07-08 DIAGNOSIS — I10 ESSENTIAL HYPERTENSION: Primary | ICD-10-CM

## 2024-07-08 DIAGNOSIS — R73.9 ELEVATED BLOOD SUGAR: ICD-10-CM

## 2024-07-15 ENCOUNTER — OFFICE VISIT (OUTPATIENT)
Dept: INTERNAL MEDICINE CLINIC | Facility: CLINIC | Age: 64
End: 2024-07-15

## 2024-07-15 ENCOUNTER — TELEPHONE (OUTPATIENT)
Dept: INTERNAL MEDICINE CLINIC | Facility: CLINIC | Age: 64
End: 2024-07-15

## 2024-07-15 ENCOUNTER — LAB ENCOUNTER (OUTPATIENT)
Dept: LAB | Facility: HOSPITAL | Age: 64
End: 2024-07-15
Attending: INTERNAL MEDICINE
Payer: MEDICARE

## 2024-07-15 VITALS
TEMPERATURE: 99 F | OXYGEN SATURATION: 98 % | DIASTOLIC BLOOD PRESSURE: 66 MMHG | BODY MASS INDEX: 24.96 KG/M2 | HEART RATE: 67 BPM | WEIGHT: 157.19 LBS | HEIGHT: 66.5 IN | SYSTOLIC BLOOD PRESSURE: 120 MMHG

## 2024-07-15 DIAGNOSIS — Z00.00 MEDICARE ANNUAL WELLNESS VISIT, INITIAL: Primary | ICD-10-CM

## 2024-07-15 DIAGNOSIS — M47.816 OSTEOARTHRITIS OF LUMBAR SPINE, UNSPECIFIED SPINAL OSTEOARTHRITIS COMPLICATION STATUS: ICD-10-CM

## 2024-07-15 DIAGNOSIS — R42 VERTIGO: ICD-10-CM

## 2024-07-15 DIAGNOSIS — Z00.00 ROUTINE HEALTH MAINTENANCE: ICD-10-CM

## 2024-07-15 DIAGNOSIS — I10 ESSENTIAL HYPERTENSION: ICD-10-CM

## 2024-07-15 DIAGNOSIS — I35.1 NONRHEUMATIC AORTIC VALVE INSUFFICIENCY: ICD-10-CM

## 2024-07-15 DIAGNOSIS — Z78.0 ASYMPTOMATIC POSTMENOPAUSAL STATE: ICD-10-CM

## 2024-07-15 DIAGNOSIS — Z23 NEED FOR VACCINATION AGAINST STREPTOCOCCUS PNEUMONIAE: ICD-10-CM

## 2024-07-15 DIAGNOSIS — R73.9 ELEVATED BLOOD SUGAR: ICD-10-CM

## 2024-07-15 DIAGNOSIS — M48.061 SPINAL STENOSIS OF LUMBAR REGION WITHOUT NEUROGENIC CLAUDICATION: ICD-10-CM

## 2024-07-15 DIAGNOSIS — E03.9 ACQUIRED HYPOTHYROIDISM: ICD-10-CM

## 2024-07-15 DIAGNOSIS — E03.9 HYPOTHYROIDISM, UNSPECIFIED TYPE: ICD-10-CM

## 2024-07-15 LAB
ALBUMIN SERPL-MCNC: 4.3 G/DL (ref 3.2–4.8)
ALBUMIN/GLOB SERPL: 1.5 {RATIO} (ref 1–2)
ALP LIVER SERPL-CCNC: 87 U/L
ALT SERPL-CCNC: 14 U/L
ANION GAP SERPL CALC-SCNC: 5 MMOL/L (ref 0–18)
AST SERPL-CCNC: 22 U/L (ref ?–34)
BASOPHILS # BLD AUTO: 0.02 X10(3) UL (ref 0–0.2)
BASOPHILS NFR BLD AUTO: 0.4 %
BILIRUB SERPL-MCNC: 0.7 MG/DL (ref 0.2–1.1)
BILIRUB UR QL: NEGATIVE
BUN BLD-MCNC: 13 MG/DL (ref 9–23)
BUN/CREAT SERPL: 15.7 (ref 10–20)
CALCIUM BLD-MCNC: 9.8 MG/DL (ref 8.7–10.4)
CHLORIDE SERPL-SCNC: 109 MMOL/L (ref 98–112)
CHOLEST SERPL-MCNC: 190 MG/DL (ref ?–200)
CLARITY UR: CLEAR
CO2 SERPL-SCNC: 30 MMOL/L (ref 21–32)
CREAT BLD-MCNC: 0.83 MG/DL
DEPRECATED RDW RBC AUTO: 43.8 FL (ref 35.1–46.3)
EGFRCR SERPLBLD CKD-EPI 2021: 78 ML/MIN/1.73M2 (ref 60–?)
EOSINOPHIL # BLD AUTO: 0.05 X10(3) UL (ref 0–0.7)
EOSINOPHIL NFR BLD AUTO: 1 %
ERYTHROCYTE [DISTWIDTH] IN BLOOD BY AUTOMATED COUNT: 12.8 % (ref 11–15)
EST. AVERAGE GLUCOSE BLD GHB EST-MCNC: 105 MG/DL (ref 68–126)
FASTING PATIENT LIPID ANSWER: YES
FASTING STATUS PATIENT QL REPORTED: YES
GLOBULIN PLAS-MCNC: 2.8 G/DL (ref 2–3.5)
GLUCOSE BLD-MCNC: 88 MG/DL (ref 70–99)
GLUCOSE UR-MCNC: NORMAL MG/DL
HBA1C MFR BLD: 5.3 % (ref ?–5.7)
HCT VFR BLD AUTO: 38.7 %
HDLC SERPL-MCNC: 52 MG/DL (ref 40–59)
HGB BLD-MCNC: 13.1 G/DL
HGB UR QL STRIP.AUTO: NEGATIVE
IMM GRANULOCYTES # BLD AUTO: 0 X10(3) UL (ref 0–1)
IMM GRANULOCYTES NFR BLD: 0 %
KETONES UR-MCNC: NEGATIVE MG/DL
LDLC SERPL CALC-MCNC: 125 MG/DL (ref ?–100)
LEUKOCYTE ESTERASE UR QL STRIP.AUTO: NEGATIVE
LYMPHOCYTES # BLD AUTO: 1.47 X10(3) UL (ref 1–4)
LYMPHOCYTES NFR BLD AUTO: 30.6 %
MCH RBC QN AUTO: 31.6 PG (ref 26–34)
MCHC RBC AUTO-ENTMCNC: 33.9 G/DL (ref 31–37)
MCV RBC AUTO: 93.3 FL
MONOCYTES # BLD AUTO: 0.36 X10(3) UL (ref 0.1–1)
MONOCYTES NFR BLD AUTO: 7.5 %
NEUTROPHILS # BLD AUTO: 2.91 X10 (3) UL (ref 1.5–7.7)
NEUTROPHILS # BLD AUTO: 2.91 X10(3) UL (ref 1.5–7.7)
NEUTROPHILS NFR BLD AUTO: 60.5 %
NITRITE UR QL STRIP.AUTO: NEGATIVE
NONHDLC SERPL-MCNC: 138 MG/DL (ref ?–130)
OSMOLALITY SERPL CALC.SUM OF ELEC: 298 MOSM/KG (ref 275–295)
PH UR: 6.5 [PH] (ref 5–8)
PLATELET # BLD AUTO: 173 10(3)UL (ref 150–450)
POTASSIUM SERPL-SCNC: 4.1 MMOL/L (ref 3.5–5.1)
PROT SERPL-MCNC: 7.1 G/DL (ref 5.7–8.2)
PROT UR-MCNC: NEGATIVE MG/DL
RBC # BLD AUTO: 4.15 X10(6)UL
SODIUM SERPL-SCNC: 144 MMOL/L (ref 136–145)
SP GR UR STRIP: 1.01 (ref 1–1.03)
TRIGL SERPL-MCNC: 69 MG/DL (ref 30–149)
TSI SER-ACNC: 1.28 MIU/ML (ref 0.55–4.78)
UROBILINOGEN UR STRIP-ACNC: NORMAL
VLDLC SERPL CALC-MCNC: 12 MG/DL (ref 0–30)
WBC # BLD AUTO: 4.8 X10(3) UL (ref 4–11)

## 2024-07-15 PROCEDURE — 80061 LIPID PANEL: CPT

## 2024-07-15 PROCEDURE — 81003 URINALYSIS AUTO W/O SCOPE: CPT

## 2024-07-15 PROCEDURE — 80053 COMPREHEN METABOLIC PANEL: CPT

## 2024-07-15 PROCEDURE — 36415 COLL VENOUS BLD VENIPUNCTURE: CPT

## 2024-07-15 PROCEDURE — 83036 HEMOGLOBIN GLYCOSYLATED A1C: CPT

## 2024-07-15 PROCEDURE — 85025 COMPLETE CBC W/AUTO DIFF WBC: CPT

## 2024-07-15 PROCEDURE — 84443 ASSAY THYROID STIM HORMONE: CPT

## 2024-07-15 RX ORDER — METRONIDAZOLE 10 MG/G
1 GEL TOPICAL DAILY
Qty: 60 G | Refills: 3 | Status: SHIPPED | OUTPATIENT
Start: 2024-07-15

## 2024-07-15 RX ORDER — PHENOL 1.4 %
AEROSOL, SPRAY (ML) MUCOUS MEMBRANE NIGHTLY
COMMUNITY

## 2024-07-15 NOTE — PROGRESS NOTES
Mirta Cruz is a 63 year old female.  HPI:     Chief Complaint   Patient presents with    Physical     Intro Medicare Px      Mirta presents for Medicare annual wellness visit    She is doing relatively well.    She does have significant low back pain.  She has had MRIs and CT scans of the lumbar spine in the past that shows showing DJD.  She has seen spine specialist in the past but the last was in 2017.  I think might be wise to update her MRI of the lumbar spine to make sure that there is no progressive spinal stenosis or any other neurological issue going on in her spine.  She is agreeable and I did give her MRI scan to schedule.    She has no bladder or bowel problems.  She has no weakness in her legs.    She sees cardiology once a year.    She keeps up with Dr. Sy her gynecologist    I did give her DEXA scan order.    She received PCV 20 vaccine today.  I did give her RSV vaccine literature.  She has had Shingrix x 2    Her next colonoscopy will be 2025 as last 1 was 2015.  Her creatinine mother had colon cancer at 82.  Her mother had polyps.  We talked about getting a colonoscopy and she may look for a gastroenterologist in [facility] where she lives a large part of her year.  Current Outpatient Medications   Medication Sig Dispense Refill    metroNIDAZOLE 1 % External Gel Apply 1 Application topically daily. 60 g 3    Melatonin 10 MG Oral Tab Take by mouth at bedtime.      Meloxicam 15 MG Oral Tab TAKE 1 TABLET BY MOUTH DAILY AS NEEDED FOR BACK PAIN 30 tablet 2    enalapril 5 MG Oral Tab Take 1 tablet (5 mg total) by mouth daily. 90 tablet 3    levothyroxine (SYNTHROID) 75 MCG Oral Tab Take 1 tablet (75 mcg total) by mouth daily. 90 tablet 3    Coenzyme Q10 (CO Q 10) 60 MG Oral Cap Take by mouth.      cholecalciferol 25 MCG (1000 UT) Oral Tab Take 1 tablet (1,000 Units total) by mouth daily.      aspirin 81 MG Oral Tab Take 1 tablet (81 mg total) by mouth daily.  0    Multiple Vitamin Oral Tab  Take 1 tablet by mouth daily.  0    cyclobenzaprine 10 MG Oral Tab Take 1 tablet (10 mg total) by mouth daily as needed. (Patient not taking: Reported on 7/15/2024)        Past Medical History:    ACL (anterior cruciate ligament) tear    Aortic insufficiency    Bursitis of right hip    Hypothyroidism    Lumbar spondylosis      Social History:  Social History     Socioeconomic History    Marital status:    Tobacco Use    Smoking status: Never    Smokeless tobacco: Never   Vaping Use    Vaping status: Never Used   Substance and Sexual Activity    Alcohol use: No     Alcohol/week: 0.0 standard drinks of alcohol    Drug use: No   Other Topics Concern    Caffeine Concern Yes     Comment: coke    Exercise Yes     Comment: somewhat    Right Handed Yes    Currently spends a great deal of time in the sun Yes    History of tanning No    Hx of Spending Great Deal of Time in Sun No    Bad sunburns in the past Yes    Tanning Salons in the Past No    Hx of Radiation Treatments No        REVIEW OF SYSTEMS:   GENERAL HEALTH:  feels well otherwise  RESPIRATORY:  Voices no shortness of breath with exertion or cough  CARDIOVASCULAR:  Voices no chest pain on exertion or shortness of breath  GI:   Voices no abdominal pain or changes of bowels   :Viices no urning or frequency of urination.  NEURO:  Voices no  headaches or dizziness    EXAM:   /66   Pulse 67   Temp 98.5 °F (36.9 °C)   Ht 5' 6.5\" (1.689 m)   Wt 157 lb 3.2 oz (71.3 kg)   SpO2 98%   BMI 24.99 kg/m²     GENERAL:  well developed, well nourished, in no apparent distress  SKIN:  no rashes , no suspicious lesions  HEENT: atraumatic.    Pharynx normal without exudate.  EYES:  PERRL. Sclera anicteric.  NECK:  Supple,  no adenopathy,  thyroid normal  LUNGS:  clear to auscultation.  Effort normal  CARDIO:  RRR without murmur.   S1 and S2 normal  GI:  good BS's,  no masses,   HSM or tenderness  EXTREMITIES : no cyanosis, clubbing or edema  NEURO: Good motor power  lower extremities.  I cannot get a knee or ankle reflex bilaterally but she does not have any numbness or radiculopathy symptoms.      General Health     In the past six months, have you lost more than 10 pounds without trying?: 2 - No    Has your appetite been poor?: No    Type of Diet: Other    How does the patient maintain a good energy level?: Daily Walks    How would you describe your daily physical activity?: Light    How would you describe your current health state?: Good    How do you maintain positive mental well-being?: Visiting Family;Social Interaction;Puzzles;Games;Visiting Friends         Have you had any immunizations at another office such as Influenza, Hepatitis B, Tetanus, or Pneumococcal?: Yes     Functional Ability     Bathing or Showering: Able without help    Toileting: Able without help    Dressing: Able without help    Eating: Able without help    Driving: Able without help    Preparing your meals: Able without help    Managing money/bills: Able without help    Taking medications as prescribed: Able without help    Are you able to afford your medications?: Yes    Hearing Problems?: No     Functional Status     Hearing Problems?: No    Vision Problems? : No    Difficulty walking?: No    Difficulty dressing or bathing?: No    Problems with daily activities? : No    Memory Problems?: No      Fall/Risk Assessment                                                              Depression Screening (PHQ-2/PHQ-9): Over the LAST 2 WEEKS                      Advance Directives     Do you have a healthcare power of ?: Yes    Do you have a living will?: Yes     Hearing Assessment (Required for AWV/SWV)      Hearing Screening    Screening Method: Questionnaire  I have a problem hearing over the telephone: No I have trouble following the conversations when two or more people are talking at the same time: No   I have trouble understanding things on the TV: Yes I have to strain to understand  conversations: Yes   I have to worry about missing the telephone ring or doorbell: No I have trouble hearing conversations in a noisy background such as a crowded room or restaurant: Yes   I get confused about where sounds come from: No I misunderstand some words in a sentence and need to ask people to repeat themselves: Yes   I especially have trouble understanding the speech of women and children: No I have trouble understanding the speaker in a large room such as at a meeting or place of Confucianism: Yes   Many people I talk to seem to mumble (or don't speak clearly): Yes People get annoyed because I misunderstand what they say: Yes   I misunderstand what others are saying and make inappropriate responses: No I avoid social activities because I cannot hear well and fear I will reply improperly: No   Family members and friends have told me they think I may have hearing loss: Yes             Visual Acuity     Right Eye Visual Acuity: Uncorrected Left Eye Visual Acuity: Uncorrected   Right Eye Chart Acuity: 20/70 Left Eye Chart Acuity: 20/70     Cognitive Assessment     What day of the week is this?: Correct    What month is it?: Correct    What year is it?: Correct    Recall \"Ball\": Correct    Recall \"Flag\": Correct    Recall \"Tree\": Correct         Mirta Cruz's SCREENING SCHEDULE   Tests on this list are recommended by your physician but may not be covered, or covered at this frequency, by your insurer. Please check with your insurance carrier before scheduling to verify coverage.   PREVENTATIVE SERVICES  INDICATIONS AND SCHEDULE Internal Lab or Procedure External Lab or Procedure   Diabetes Screening      HbgA1C   Annually HgbA1C (%)   Date Value   07/15/2024 5.3         No data to display                Fasting Blood Sugar (FSB)Annually Glucose (mg/dL)   Date Value   07/15/2024 88         No data to display               Cardiovascular Disease Screening     LDL Annually LDL Cholesterol (mg/dL)   Date Value    07/15/2024 125 (H)        EKG One Time     Colorectal Cancer Screening      Colonoscopy Screen every 10 years Health Maintenance   Topic Date Due    Colorectal Cancer Screening  07/14/2025    Update Health Maintenance if applicable    Flex Sigmoidoscopy Screen every 5 years No results found for this or any previous visit.      No data to display                 Fecal Occult Blood Annually No results found for: \"FOB\", \"OCCULTSTOOL\"      No data to display                Glaucoma Screening      Ophthalmology Visit Annually     Bone Density Screening      Dexascan Every two years No results found for this or any previous visit.        No data to display               Pap and Pelvic      Pap: Every 3 yrs age 21-65 or Pap+HPV every 5 yrs age 30-65, age 65 and older at high risk   Health Maintenance   Topic Date Due    Pap Smear  09/15/2024    Update Health Maintenance if applicable    Chlamydia  Annually if high risk No results found for: \"CHLAMYDIA\"      No data to display                Screening Mammogram      Mammogram  Annually Health Maintenance   Topic Date Due    Mammogram  07/09/2024    Update Health Maintenance if applicable   Immunizations      Influenza No orders found for this or any previous visit. Update Immunization Activity if applicable    Pneumococcal No orders found for this or any previous visit. Update Immunization Activity if applicable    Hepatitis B No orders found for this or any previous visit. Update Immunization Activity if applicable    Tetanus No orders found for this or any previous visit. Update Immunization Activity if applicable    Zoster (Not covered by Medicare Part B) No orders found for this or any previous visit. Update Immunization Activity if applicable     SPECIFIC DISEASE MONITORING Internal Lab or Procedure External Lab or Procedure   Annual Monitoring of Persistent     Medications (ACE/ARB, digoxin, diuretics)    Potassium  Annually Potassium (mmol/L)   Date Value    07/15/2024 4.1     POTASSIUM (P) (mmol/L)   Date Value   06/08/2016 4.2         No data to display                Creatinine  Annually Creatinine (mg/dL)   Date Value   07/15/2024 0.83         No data to display                Digoxin Serum Conc  Annually No results found for: \"DIGOXIN\"      No data to display                Diabetes      HgbA1C  Annually HgbA1C (%)   Date Value   07/15/2024 5.3         No data to display                Creat/alb ratio  Annually      LDL  Annually LDL Cholesterol (mg/dL)   Date Value   07/15/2024 125 (H)         No data to display                 Dilated Eye exam  Annually      No data to display                   No data to display                COPD      Spirometry Testing Annually No results found for this or any previous visit.      No data to display                       ASSESSMENT AND PLAN:     1. Medicare annual wellness visit, initial  Medicare annual wellness visit    2. Essential hypertension  Blood pressure under good control.  Continue current therapy    3. Hypothyroidism, unspecified type  On thyroid supplement.  Update TSH.    4. Acquired hypothyroidism  On thyroid supplement.  Update TSH.    5. Nonrheumatic aortic valve insufficiency  Asymptomatic.  She follows with cardiology.    6. Routine health maintenance  See above for vaccine discussion    7. Asymptomatic postmenopausal state  DEXA scan.  - XR DEXA BONE DENSITOMETRY (CPT=77080); Future    8. Spinal stenosis of lumbar region without neurogenic claudication  MRI lumbar spine to update anatomy.  She indicates that the pain in her spine can be moderate.  For now no radiculopathy features  - MRI SPINE LUMBAR (CPT=72148); Future    9. Osteoarthritis of lumbar spine, unspecified spinal osteoarthritis complication status  Osteoarthritis of lumbar spine.  See above.  - MRI SPINE LUMBAR (CPT=72148); Future    10. Vertigo  While ago she had a bad case of vertigo.  She has since not had any bad cases but does get dizzy  and vertiginous at times.  I have referred her to Dr. Childers.  - ENT Referral - In Network    11. Need for vaccination against Streptococcus pneumoniae  PCV 20 vaccine  - Prevnar 20 (PCV20) [23942]    This visit was 30 minutes.  I spent 10 minutes before visit preparing and reviewing old records.  Greater than 50% of the visit was engaged in counseling and review of past data.    Problem list as noted in electronic health record reviewed.  Stable.  Will be followed.    Hypothyroidism  Lumbar spondylosis  Aortic insufficiency  Essential hypertension    This visit was 30 minutes.  I spent 10 minutes before visit preparing and reviewing old records.  Greater than 50% of the visit was engaged in counseling and review of past data.    Follow-up in 1 year.     The patient indicates understanding of these issues and agrees to the plan.    Dariusz Reynolds MD  7/15/2024  3:24 PM

## 2024-07-15 NOTE — TELEPHONE ENCOUNTER
S/w patient and relayed MD message.  Verbalizes understanding and agreement with tx. plan   Verified pt's correct home address on file and mailed lab results out to her

## 2024-07-15 NOTE — TELEPHONE ENCOUNTER
Please let Mirta know the following regarding her test results    Chemistries look good to me.  Blood sugar 88.  Hemoglobin A1c 5.3.  This is a very good result.    2.   Cholesterol is a little bit higher in terms of her \"LDL\" cholesterol.  A year ago she was 93 and this time 125.  We do like it less than 100.  For now she can just review with Dr. Thao if she needs any cholesterol medicine for now.  Hopefully not.    3.  Her thyroid was good.  Her blood count is good.  All in all I am satisfied with the results.    4.  Made a copy of them that we can mail to her. (Please confirm address)

## 2024-07-22 ENCOUNTER — TELEPHONE (OUTPATIENT)
Dept: INTERNAL MEDICINE CLINIC | Facility: CLINIC | Age: 64
End: 2024-07-22

## 2024-07-22 NOTE — TELEPHONE ENCOUNTER
Received documentation from [facility] to send insurance auth. And approval number to [id number].    Their NPI is [id number].    Tax ID [id number]    Please forward to managed care. I will keep paperwork on my desk.

## 2024-07-30 NOTE — TELEPHONE ENCOUNTER
Patient called to check status of Authorization for MRI needed prior to being cici to schedule appointment.     Called [employer] while patient was on hold. Per [employer] authorization is still pending. Status inquiry has been sent to specialty department that handles authorization.     Advised patient we will call once we receive a status update.

## 2024-07-31 NOTE — TELEPHONE ENCOUNTER
MRI SPINE LUMBAR (CPT=72148)         IB Message received, pt is going to [facility]  NPI is [id number]      [employer] online, clinical notes sent for review   Case Number:[id number]    .Case is pending

## 2024-08-06 ENCOUNTER — TELEPHONE (OUTPATIENT)
Dept: INTERNAL MEDICINE CLINIC | Facility: CLINIC | Age: 64
End: 2024-08-06

## 2024-08-06 NOTE — TELEPHONE ENCOUNTER
Please let Mirta know that her DEXA scan from July 30 does show \"osteopenia\".  Not quite \"osteoporosis\".    This means her bones are little bit \"thinner\" than normal.    Treatment now would be a calcium supplement of either 500 or 600 mg once a day.  Also vitamin D supplement of 1000 units a day.    In addition the MRI lumbar spine order has been sent to manage care for approval.  Hopefully once we get the approval we will be notified and she can go ahead and schedule her MRI lumbar spine.

## 2024-08-14 ENCOUNTER — TELEPHONE (OUTPATIENT)
Dept: ADMINISTRATIVE | Facility: HOSPITAL | Age: 64
End: 2024-08-14

## 2024-08-14 NOTE — TELEPHONE ENCOUNTER
Good Morning Micaela,  Below is the denial that was sent to the Patient.  If you have any question please reach out to me.    Shirley Morin        Denial Reason:    Notice of Denial of Medical Coverage   Date: 8/11/2024 Member number: [id number]  Name: FEROZ GOODRICH  Your request was denied  We’ve denied the medical services/items listed below requested by you or your doctor:  Procedure Description Units   Requested  Units   Denied  74559 Magnetic Resonance Imaging (MRI) L   Spine without Contrast, a special picture   study of the lower part of the spine without   injected dye  1 1  Why did we deny your request?  We denied the medical services/items listed above because:   For 37856 Magnetic Resonance Imaging (MRI), a special kind of picture of your lower back   without contrast (dye) Your doctor told us that you have had low back pain for a very long time.   We cannot approve this request because:  One of the following must be met.  -You have signs of impaired nerve, spinal cord, or brain function.  -There are changes in your symptoms that suggest a new problem.  -There are severe changes in your physical exam results. The notes sent to us do not show one of   these applies to you.  Imaging requires six weeks of provider directed treatment to be completed. This must have been   completed in the past three months without improved symptoms. Contact (via office visit, phone,   email, or messaging) must occur after the treatment is completed. This has not been met because:  You have not completed six weeks of provider directed treatment  The provider directed treatment did not occur within the last three months.  Symptoms must be the same or worse after treatment to support imaging.  There was no contact with your provider after completing treatment. We told your doctor about   this. Please talk to your doctor if you have questions.  This finding was based on review of Medicare National Coverage Determinations (NCD):  220.2   Magnetic Resonance Imaging and [employer] Spine Imaging Guidelines Section(s): Low Back   (Lumbar Spine) Pain without Neurological Features (SP 5.1) and 1.0 General Guidelines.

## 2024-09-09 ENCOUNTER — TELEPHONE (OUTPATIENT)
Dept: OBGYN | Age: 64
End: 2024-09-09

## 2024-09-09 DIAGNOSIS — Z12.31 ENCOUNTER FOR SCREENING MAMMOGRAM FOR MALIGNANT NEOPLASM OF BREAST: Primary | ICD-10-CM

## 2024-10-09 ENCOUNTER — OFFICE VISIT (OUTPATIENT)
Dept: PHYSICAL MEDICINE AND REHAB | Facility: CLINIC | Age: 64
End: 2024-10-09
Payer: MEDICARE

## 2024-10-09 ENCOUNTER — HOSPITAL ENCOUNTER (OUTPATIENT)
Dept: GENERAL RADIOLOGY | Facility: HOSPITAL | Age: 64
Discharge: HOME | End: 2024-10-09
Attending: PHYSICAL MEDICINE & REHABILITATION
Payer: MEDICARE

## 2024-10-09 ENCOUNTER — TELEPHONE (OUTPATIENT)
Dept: PHYSICAL MEDICINE AND REHAB | Facility: CLINIC | Age: 64
End: 2024-10-09

## 2024-10-09 VITALS — BODY MASS INDEX: 25 KG/M2 | WEIGHT: 155 LBS

## 2024-10-09 DIAGNOSIS — M79.18 MYOFASCIAL PAIN: Primary | ICD-10-CM

## 2024-10-09 DIAGNOSIS — M47.816 LUMBAR SPONDYLOSIS: ICD-10-CM

## 2024-10-09 DIAGNOSIS — M47.816 LUMBAR SPONDYLOSIS: Primary | ICD-10-CM

## 2024-10-09 PROCEDURE — 99203 OFFICE O/P NEW LOW 30 MIN: CPT | Performed by: PHYSICAL MEDICINE & REHABILITATION

## 2024-10-09 PROCEDURE — 72100 X-RAY EXAM L-S SPINE 2/3 VWS: CPT | Performed by: PHYSICAL MEDICINE & REHABILITATION

## 2024-10-09 NOTE — TELEPHONE ENCOUNTER
Pt requesting order for massage therapy along with physical therapy. Has had a few treatments in the past and states they have been very helpful especially with lower back pain. Please advise, thank you.

## 2024-10-09 NOTE — PROGRESS NOTES
[facility]  Progress Note    CHIEF COMPLAINT:    Chief Complaint   Patient presents with    Follow - Up     Lov 6/25/20 lower back pain Bilateral L3-4, L4-5 and L5-S1 Facet injection on last office visit .Pain 2/10. No T/N. No recent imagine. Meloxicam with some relief.        History of Present Illness:  Mirta Cruz is a 64 year old female who is being seen for a new patient evaluation. I last saw the patient in 2020 for facet injections under conscious sedation.  She has 2/10 posterior torso pain.  She previously had facet injections in 2015 which also gave her 90% relief.  She is reestablishing care at the request of Dr. Reynolds.  He ordered an MRI but it was denied.  She has no leg radiation, no numbness or tingling, no weakness.  She uses meloxicam periodically.     PAST MEDICAL HISTORY:  Past Medical History:    ACL (anterior cruciate ligament) tear    ACL (anterior cruciate ligament) tear    Aortic insufficiency    Bursitis of right hip    Bursitis of right hip    Hypothyroidism    Lumbar spondylosis       SURGICAL HISTORY:  Past Surgical History:   Procedure Laterality Date    Other surgical history  6-5-2014    shoulder surgery    Tonsillectomy         SOCIAL HISTORY:   Social History     Occupational History    Not on file   Tobacco Use    Smoking status: Never    Smokeless tobacco: Never   Vaping Use    Vaping status: Never Used   Substance and Sexual Activity    Alcohol use: No     Alcohol/week: 0.0 standard drinks of alcohol    Drug use: No    Sexual activity: Not on file       CURRENT MEDICATIONS:   Current Outpatient Medications   Medication Sig Dispense Refill    metroNIDAZOLE 1 % External Gel Apply 1 Application topically daily. 60 g 3    Melatonin 10 MG Oral Tab Take by mouth at bedtime.      Meloxicam 15 MG Oral Tab TAKE 1 TABLET BY MOUTH DAILY AS NEEDED FOR BACK PAIN 30 tablet 2    enalapril 5 MG Oral Tab Take 1 tablet (5 mg total) by mouth  daily. 90 tablet 3    levothyroxine (SYNTHROID) 75 MCG Oral Tab Take 1 tablet (75 mcg total) by mouth daily. 90 tablet 3    Coenzyme Q10 (CO Q 10) 60 MG Oral Cap Take by mouth.      cholecalciferol 25 MCG (1000 UT) Oral Tab Take 1 tablet (1,000 Units total) by mouth daily.      aspirin 81 MG Oral Tab Take 1 tablet (81 mg total) by mouth daily.  0    Multiple Vitamin Oral Tab Take 1 tablet by mouth daily.  0    cyclobenzaprine 10 MG Oral Tab Take 1 tablet (10 mg total) by mouth daily as needed. (Patient not taking: Reported on 10/9/2024)         ALLERGIES:   Allergies[1]    REVIEW OF SYSTEMS:   Patient-reported ROS  Constitutional  Sleep Disturbance: admits  Chills: denies  Fever: denies  Weight Gain: admits  Weight Loss: denies   Cardiovascular  Chest Pain: denies  Irregular Heartbeat: denies   Respiratory  Painful Breathing: denies  Wheezing: denies   Gastrointestinal  Bowel Incontinence: denies  Heartburn: denies  Abdominal Pain: denies  Blood in Stool : denies  Rectal Pain: denies   Hematology  Easy Bruising: admits  Easy Bleeding: admits   Genitourinary  Difficulty Urinating: denies  Bladder Incontinence: denies  Pelvic Pain: denies  Painful Urination: denies   Musculoskeletal  Joint Stiffness: admits  Painful Joints: admits  Tailbone Pain: denies  Swollen Joints: admits   Peripheral Vascular  Swelling of Legs/Feet: denies  Cold Extremities: denies   Skin  Open Sores: denies  Nodules or Lumps: denies  Rash: denies   Neurological  Loss of Strength Since last Visit: denies  Tingling/Numbness: admits  Balance: denies   Psychiatric  Anxiety: denies  Depressed Mood: denies             PHYSICAL EXAM:   Wt 155 lb (70.3 kg)   BMI 24.64 kg/m²     Body mass index is 24.64 kg/m².      General: No immediate distress  Head: Normocephalic/ Atraumatic  Extremities: No lower extremity edema bilaterally. Peripheral pulses intact.   Spine: Full lumbar range of motion, some pain with end range extension  Hips: full and painfree  ROM   Neuro:   Cognition: alert & oriented x 3, attentive, able to follow 2 step commands, comprehention intact, spontaneous speech intact  Strength: Lower extremities have 5/5 strength  Sensation: Normal lower extremities  Reflexes: Normal lower extremities  SLR: neg    Data    Radiology Imagin.  I reviewed plain film x-rays from 2017 showing mild hip osteoarthritis, widespread disc degeneration worst at L3-4, loss of lordosis, facet arthropathy.  2.  I reviewed a lumbar MRI from 2016 showing multilevel disc bulges, worst at L3-4, no central canal stenosis, some scattered foraminal stenosis and mild facet arthropathy  3.  A bone scan from 2017 shows evidence for bilateral trochanteric bursitis.      ASSESSMENT AND PLAN:  1. Lumbar spondylosis  He has mildly symptomatic facet syndrome.  This keeps her from walking longer than 2 miles.  Functionally she is able to do all ADLs with some discomfort.  I am recommending a plain film x-ray to compare with previous x-rays.  Discussed the nature of facet arthritis as chronic and requiring long-term management preferably with home exercises.  For this reason I set her up for physical therapy.  I recommend facet injections only for severe exacerbations.  No need for an MRI given lack of neurological symptoms in the legs.  - XR LUMBAR SPINE (MIN 2 VIEWS) (CPT=72100); Future  - PHYSICAL THERAPY EXTERNAL            The patient was in agreement with the assessment and plan.  All questions were answered.        Ankur Cooney MD  Physical Medicine and Rehabilitation/Sports Medicine  [facility]         [1]   Allergies  Allergen Reactions    Cheese HIVES     Orange cheese - could be color.

## 2024-10-15 NOTE — TELEPHONE ENCOUNTER
Per Dr. Cooney's LOV (10/09/2024): \"1. Lumbar spondylosis  He has mildly symptomatic facet syndrome.  This keeps her from walking longer than 2 miles.  Functionally she is able to do all ADLs with some discomfort.  I am recommending a plain film x-ray to compare with previous x-rays.  Discussed the nature of facet arthritis as chronic and requiring long-term management preferably with home exercises.  For this reason I set her up for physical therapy.  I recommend facet injections only for severe exacerbations.  No need for an MRI given lack of neurological symptoms in the legs.  - XR LUMBAR SPINE (MIN 2 VIEWS) (CPT=72100); Future  - PHYSICAL THERAPY EXTERNAL\"      No mention of massage therapy. Will route to Dr. Cooney for his review/recommendations.

## 2024-10-17 NOTE — TELEPHONE ENCOUNTER
Spoke with patient and reviewed message below. Patient understood and stated she will call back tomorrow morning with the fax # of where massage therapy order needs to be faxed.

## 2024-10-23 ENCOUNTER — HOSPITAL ENCOUNTER (OUTPATIENT)
Dept: MAMMOGRAPHY | Facility: HOSPITAL | Age: 64
Discharge: HOME | End: 2024-10-23
Attending: OBSTETRICS & GYNECOLOGY
Payer: MEDICARE

## 2024-10-23 DIAGNOSIS — Z12.31 ENCOUNTER FOR SCREENING MAMMOGRAM FOR MALIGNANT NEOPLASM OF BREAST: ICD-10-CM

## 2024-10-23 PROCEDURE — 77067 SCR MAMMO BI INCL CAD: CPT | Performed by: OBSTETRICS & GYNECOLOGY

## 2024-10-23 PROCEDURE — 77063 BREAST TOMOSYNTHESIS BI: CPT | Performed by: OBSTETRICS & GYNECOLOGY

## 2024-10-24 NOTE — TELEPHONE ENCOUNTER
Please let patient know that her blood pressures are just a little bit higher on average than I would like. I think an easy solution is to increase her enalapril from 2.5 mg up to 5 mg. The 5 mg dosage daily is still on the low side. I think she will easily tolerate this and I am thinking it will bring her blood pressure down another 5 points or so. Therefore I pended enalapril 5 mg for her if she is agreeable. I pended it for #90 with 3 refills. She can then let us know what her blood pressures are after 2 or 3 weeks. elevated BP at DC/ED physician notified

## 2024-11-07 RX ORDER — LEVOTHYROXINE SODIUM 75 UG/1
75 TABLET ORAL DAILY
Qty: 90 TABLET | Refills: 3 | Status: SHIPPED | OUTPATIENT
Start: 2024-11-07

## 2024-11-07 RX ORDER — ENALAPRIL MALEATE 5 MG/1
5 TABLET ORAL DAILY
Qty: 90 TABLET | Refills: 3 | Status: SHIPPED | OUTPATIENT
Start: 2024-11-07

## 2024-11-07 NOTE — TELEPHONE ENCOUNTER
Refill request is for a maintenance medication and has met the criteria specified in the Ambulatory Medication Refill Standing Order for eligibility, visits, laboratory, alerts and was sent to the requested pharmacy.    Requested Prescriptions     Signed Prescriptions Disp Refills    ENALAPRIL 5 MG Oral Tab 90 tablet 3     Sig: TAKE 1 TABLET DAILY     Authorizing Provider: BRIAN CAMPA     Ordering User: JUANCARLOS FINN    LEVOTHYROXINE 75 MCG Oral Tab 90 tablet 3     Sig: TAKE 1 TABLET DAILY     Authorizing Provider: BRIAN CAMPA     Ordering User: JUANCARLOS FINN

## 2024-11-12 ENCOUNTER — MED REC SCAN ONLY (OUTPATIENT)
Dept: PHYSICAL MEDICINE AND REHAB | Facility: CLINIC | Age: 64
End: 2024-11-12

## 2024-11-12 RX ORDER — MELOXICAM 15 MG/1
TABLET ORAL
Qty: 30 TABLET | Refills: 2 | Status: SHIPPED | OUTPATIENT
Start: 2024-11-12

## 2024-11-12 NOTE — TELEPHONE ENCOUNTER
Refill request is for a maintenance medication and has met the criteria specified in the Ambulatory Medication Refill Standing Order for eligibility, visits, laboratory, alerts and was sent to the requested pharmacy.    Requested Prescriptions     Signed Prescriptions Disp Refills    MELOXICAM 15 MG Oral Tab 30 tablet 2     Sig: TAKE 1 TABLET BY MOUTH DAILY AS NEEDED FOR BACK PAIN     Authorizing Provider: BRIAN CAMPA     Ordering User: JUANCARLOS FINN

## 2024-11-13 ENCOUNTER — APPOINTMENT (OUTPATIENT)
Dept: OBGYN | Age: 64
End: 2024-11-13

## 2024-11-13 VITALS
SYSTOLIC BLOOD PRESSURE: 146 MMHG | HEART RATE: 67 BPM | DIASTOLIC BLOOD PRESSURE: 83 MMHG | WEIGHT: 158 LBS | OXYGEN SATURATION: 99 % | BODY MASS INDEX: 24.8 KG/M2 | HEIGHT: 67 IN | TEMPERATURE: 98 F

## 2024-11-13 DIAGNOSIS — Z12.31 ENCOUNTER FOR SCREENING MAMMOGRAM FOR MALIGNANT NEOPLASM OF BREAST: ICD-10-CM

## 2024-11-13 DIAGNOSIS — Z01.419 ENCOUNTER FOR ROUTINE GYNECOLOGIC EXAMINATION IN MEDICARE PATIENT: Primary | ICD-10-CM

## 2024-11-13 DIAGNOSIS — Z12.4 SCREENING FOR MALIGNANT NEOPLASM OF THE CERVIX: ICD-10-CM

## 2024-11-13 DIAGNOSIS — Z11.51 SCREENING FOR HUMAN PAPILLOMAVIRUS (HPV): ICD-10-CM

## 2024-11-13 RX ORDER — PHENOL 1.4 %
AEROSOL, SPRAY (ML) MUCOUS MEMBRANE
COMMUNITY

## 2024-11-13 ASSESSMENT — PATIENT HEALTH QUESTIONNAIRE - PHQ9
SUM OF ALL RESPONSES TO PHQ9 QUESTIONS 1 AND 2: 0
2. FEELING DOWN, DEPRESSED OR HOPELESS: NOT AT ALL
1. LITTLE INTEREST OR PLEASURE IN DOING THINGS: NOT AT ALL
SUM OF ALL RESPONSES TO PHQ9 QUESTIONS 1 AND 2: 0
CLINICAL INTERPRETATION OF PHQ2 SCORE: NO FURTHER SCREENING NEEDED

## 2024-11-25 LAB
CASE RPRT: NORMAL
CYTOLOGY CVX/VAG STUDY: NORMAL
HPV16+18+45 E6+E7MRNA CVX NAA+PROBE: NEGATIVE
Lab: NORMAL
PAP EDUCATIONAL NOTE: NORMAL
SPECIMEN ADEQUACY: NORMAL

## 2024-12-20 ENCOUNTER — MED REC SCAN ONLY (OUTPATIENT)
Dept: PHYSICAL MEDICINE AND REHAB | Facility: CLINIC | Age: 64
End: 2024-12-20

## 2025-01-07 ENCOUNTER — MED REC SCAN ONLY (OUTPATIENT)
Dept: PHYSICAL MEDICINE AND REHAB | Facility: CLINIC | Age: 65
End: 2025-01-07

## 2025-05-28 NOTE — TELEPHONE ENCOUNTER
Orders in place  
Patient calling to request blood work prior to:    [x] physical    [] check-up      [] other    Patient uses:  [x] [facility]       [facility] location:       Fax #:    Patient prefers to be notified once labs are placed by: [x] phone call  [] my chart message     Tasked to nursing   
Patient notified that fasting labs ordered by .   
Please advise on pending labs for MA visit thanks -to   
Unknown if ever smoked

## 2025-08-19 ENCOUNTER — OFFICE VISIT (OUTPATIENT)
Dept: INTERNAL MEDICINE CLINIC | Facility: CLINIC | Age: 65
End: 2025-08-19

## 2025-08-19 ENCOUNTER — LAB ENCOUNTER (OUTPATIENT)
Dept: LAB | Age: 65
End: 2025-08-19
Attending: STUDENT IN AN ORGANIZED HEALTH CARE EDUCATION/TRAINING PROGRAM

## 2025-08-19 VITALS
DIASTOLIC BLOOD PRESSURE: 77 MMHG | RESPIRATION RATE: 18 BRPM | SYSTOLIC BLOOD PRESSURE: 137 MMHG | BODY MASS INDEX: 24.56 KG/M2 | TEMPERATURE: 98 F | OXYGEN SATURATION: 100 % | HEIGHT: 66.5 IN | HEART RATE: 68 BPM | WEIGHT: 154.63 LBS

## 2025-08-19 DIAGNOSIS — I10 ESSENTIAL HYPERTENSION: Primary | ICD-10-CM

## 2025-08-19 DIAGNOSIS — I10 ESSENTIAL HYPERTENSION: ICD-10-CM

## 2025-08-19 LAB
ALBUMIN SERPL-MCNC: 4.7 G/DL (ref 3.2–4.8)
ALBUMIN/GLOB SERPL: 1.7 (ref 1–2)
ALP LIVER SERPL-CCNC: 88 U/L (ref 55–142)
ALT SERPL-CCNC: 13 U/L (ref 10–49)
ANION GAP SERPL CALC-SCNC: 7 MMOL/L (ref 0–18)
AST SERPL-CCNC: 22 U/L (ref ?–34)
BASOPHILS # BLD AUTO: 0.02 X10(3) UL (ref 0–0.2)
BASOPHILS NFR BLD AUTO: 0.3 %
BILIRUB SERPL-MCNC: 0.4 MG/DL (ref 0.2–1.1)
BUN BLD-MCNC: 12 MG/DL (ref 9–23)
BUN/CREAT SERPL: 13.8 (ref 10–20)
CALCIUM BLD-MCNC: 9.6 MG/DL (ref 8.7–10.4)
CHLORIDE SERPL-SCNC: 103 MMOL/L (ref 98–112)
CHOLEST SERPL-MCNC: 193 MG/DL (ref ?–200)
CO2 SERPL-SCNC: 30 MMOL/L (ref 21–32)
CREAT BLD-MCNC: 0.87 MG/DL (ref 0.55–1.02)
DEPRECATED RDW RBC AUTO: 44 FL (ref 35.1–46.3)
EGFRCR SERPLBLD CKD-EPI 2021: 73 ML/MIN/1.73M2 (ref 60–?)
EOSINOPHIL # BLD AUTO: 0.07 X10(3) UL (ref 0–0.7)
EOSINOPHIL NFR BLD AUTO: 1.2 %
ERYTHROCYTE [DISTWIDTH] IN BLOOD BY AUTOMATED COUNT: 12.4 % (ref 11–15)
EST. AVERAGE GLUCOSE BLD GHB EST-MCNC: 108 MG/DL (ref 68–126)
FASTING PATIENT LIPID ANSWER: NO
FASTING STATUS PATIENT QL REPORTED: NO
GLOBULIN PLAS-MCNC: 2.8 G/DL (ref 2–3.5)
GLUCOSE BLD-MCNC: 98 MG/DL (ref 70–99)
HBA1C MFR BLD: 5.4 % (ref ?–5.7)
HCT VFR BLD AUTO: 42.2 % (ref 35–48)
HDLC SERPL-MCNC: 60 MG/DL (ref 40–59)
HGB BLD-MCNC: 13.8 G/DL (ref 12–16)
IMM GRANULOCYTES # BLD AUTO: 0.01 X10(3) UL (ref 0–1)
IMM GRANULOCYTES NFR BLD: 0.2 %
LDLC SERPL CALC-MCNC: 114 MG/DL (ref ?–100)
LYMPHOCYTES # BLD AUTO: 1.75 X10(3) UL (ref 1–4)
LYMPHOCYTES NFR BLD AUTO: 30.1 %
MCH RBC QN AUTO: 31.2 PG (ref 26–34)
MCHC RBC AUTO-ENTMCNC: 32.7 G/DL (ref 31–37)
MCV RBC AUTO: 95.5 FL (ref 80–100)
MONOCYTES # BLD AUTO: 0.42 X10(3) UL (ref 0.1–1)
MONOCYTES NFR BLD AUTO: 7.2 %
NEUTROPHILS # BLD AUTO: 3.55 X10 (3) UL (ref 1.5–7.7)
NEUTROPHILS # BLD AUTO: 3.55 X10(3) UL (ref 1.5–7.7)
NEUTROPHILS NFR BLD AUTO: 61 %
NONHDLC SERPL-MCNC: 133 MG/DL (ref ?–130)
OSMOLALITY SERPL CALC.SUM OF ELEC: 290 MOSM/KG (ref 275–295)
PLATELET # BLD AUTO: 214 10(3)UL (ref 150–450)
POTASSIUM SERPL-SCNC: 4.1 MMOL/L (ref 3.5–5.1)
PROT SERPL-MCNC: 7.5 G/DL (ref 5.7–8.2)
RBC # BLD AUTO: 4.42 X10(6)UL (ref 3.8–5.3)
SODIUM SERPL-SCNC: 140 MMOL/L (ref 136–145)
TRIGL SERPL-MCNC: 106 MG/DL (ref 30–149)
TSI SER-ACNC: 0.76 UIU/ML (ref 0.55–4.78)
VLDLC SERPL CALC-MCNC: 18 MG/DL (ref 0–30)
WBC # BLD AUTO: 5.8 X10(3) UL (ref 4–11)

## 2025-08-19 PROCEDURE — 80061 LIPID PANEL: CPT

## 2025-08-19 PROCEDURE — G2211 COMPLEX E/M VISIT ADD ON: HCPCS | Performed by: STUDENT IN AN ORGANIZED HEALTH CARE EDUCATION/TRAINING PROGRAM

## 2025-08-19 PROCEDURE — 80053 COMPREHEN METABOLIC PANEL: CPT

## 2025-08-19 PROCEDURE — 85025 COMPLETE CBC W/AUTO DIFF WBC: CPT

## 2025-08-19 PROCEDURE — 36415 COLL VENOUS BLD VENIPUNCTURE: CPT

## 2025-08-19 PROCEDURE — 1160F RVW MEDS BY RX/DR IN RCRD: CPT | Performed by: STUDENT IN AN ORGANIZED HEALTH CARE EDUCATION/TRAINING PROGRAM

## 2025-08-19 PROCEDURE — 84443 ASSAY THYROID STIM HORMONE: CPT

## 2025-08-19 PROCEDURE — 1125F AMNT PAIN NOTED PAIN PRSNT: CPT | Performed by: STUDENT IN AN ORGANIZED HEALTH CARE EDUCATION/TRAINING PROGRAM

## 2025-08-19 PROCEDURE — 99214 OFFICE O/P EST MOD 30 MIN: CPT | Performed by: STUDENT IN AN ORGANIZED HEALTH CARE EDUCATION/TRAINING PROGRAM

## 2025-08-19 PROCEDURE — 1159F MED LIST DOCD IN RCRD: CPT | Performed by: STUDENT IN AN ORGANIZED HEALTH CARE EDUCATION/TRAINING PROGRAM

## 2025-08-19 PROCEDURE — 83036 HEMOGLOBIN GLYCOSYLATED A1C: CPT

## 2025-08-19 RX ORDER — LEVOTHYROXINE SODIUM 75 UG/1
75 TABLET ORAL DAILY
Qty: 90 TABLET | Refills: 3 | Status: SHIPPED | OUTPATIENT
Start: 2025-08-19 | End: 2025-08-19

## 2025-08-19 RX ORDER — LEVOTHYROXINE SODIUM 75 UG/1
75 TABLET ORAL DAILY
Qty: 90 TABLET | Refills: 3 | Status: SHIPPED | OUTPATIENT
Start: 2025-08-19

## 2025-08-19 RX ORDER — ENALAPRIL MALEATE 5 MG/1
5 TABLET ORAL DAILY
Qty: 90 TABLET | Refills: 3 | Status: SHIPPED | OUTPATIENT
Start: 2025-08-19 | End: 2025-08-19

## 2025-08-19 RX ORDER — ENALAPRIL MALEATE 5 MG/1
5 TABLET ORAL DAILY
Qty: 90 TABLET | Refills: 3 | Status: SHIPPED | OUTPATIENT
Start: 2025-08-19

## 2025-08-19 RX ORDER — CYCLOBENZAPRINE HCL 10 MG
10 TABLET ORAL
Qty: 30 TABLET | Refills: 1 | Status: SHIPPED | OUTPATIENT
Start: 2025-08-19

## (undated) NOTE — Clinical Note
Dear Gonzalez,  I had the opportunity to see your patient Mirta Cruz recently. I appreciate your confidence in me to care for your patients. Please feel free call me with any questions at [phone] or contact me through Epic.  Sincerely, Porfirio Cooney MD Board Certified, Physical Medicine and Rehabilitation Specializing in Sports Medicine, Spine Medicine and Electrodiagnostic Medicine [facility]

## (undated) NOTE — LETTER
21      Patient: Mando Rashid  : 1960 Visit date: 2021    Dear Joy Bonner,      I examined your patient in consultation today. She has triggering and locking of the right ring finger.   We performed a steroid injection tod

## (undated) NOTE — LETTER
WHERE IS YOUR PAIN NOW?  Tommy the areas on your body where you feel the described sensations.  Use the appropriate symbol.  Tommy the areas of radiation.  Include all affected areas.  Just to complete the picture, please draw in the face.     ACHE:  ^ ^ ^   NUMBNESS:  0000   PINS & NEEDLES:  = = = =                              ^ ^ ^                       0000              = = = =                                    ^ ^ ^                       0000            = = = =      BURNING:  XXXX   STABBING: ////                  XXXX                ////                         XXXX          ////     Please tommy the line below indicating your degree of pain right now  with 0 being no pain 10 being the worst pain possible.                                         0             1             2              3             4              5              6              7             8             9             10         Patient Signature:

## (undated) NOTE — Clinical Note
January 20, 2017     [address] [facility]      Dear Angelita Jackson:    Below are the results from your recent visit:     Resulted Orders   BASIC METABOLIC PANEL (8)   Result Value Ref Range    Glucose 97 70-99 mg/dL    Sodium 142

## (undated) NOTE — LETTER
[facility] SURGERY SCHEDULING FORM   [address] [facility]   [phone] (scheduling phone) [phone] (scheduling fax)     PATIENT INFORMATION   Last Name:      Ethyl Bleacher      First Name:    Angelita Jackson Completed by:    Burke Martin      Date:    6/17/2020

## (undated) NOTE — LETTER
5/29/2020              Mirta Persaud September 30, 1960        [address]         To [address]  Fax: [phone]      Lawson Kelley should not return to work until evaluation by a

## (undated) NOTE — LETTER
22      Patient: Melly No  : 1960 Visit date: 2022    Dear Valery Ponce,      I examined your patient in consultation today. She has left ring finger triggering and locking as well as Dupuytren's involving the palm overlying the left middle finger ray. I recommend trigger release and excision of Dupuytren's. Thank you for your kind referral. If I may answer any questions, please feel free to contact me.      Sincerely,   Tulio Gaxiola MD     CC:   No Recipients

## (undated) NOTE — LETTER
06/10/20          Yeimi Del Castillodi  :  1960      To Whom It May Concern: This patient was seen in our office on 06/10/20 .       Dx: Lumbar spondylosis  Rx: Physical therapy, oral medications, lumbar injections    Work Duty Status:   Work Related:

## (undated) NOTE — LETTER
Valerie Dub 35   Date:   6/10/2020     Name:   Chelsy Espinoza    YOB: 1960   MRN:   [id number]       WHERE IS YOUR PAIN NOW? Sg the areas on your body where you feel the described sensations.   Use the appropriate sym